# Patient Record
Sex: MALE | Race: WHITE | NOT HISPANIC OR LATINO | Employment: FULL TIME | URBAN - METROPOLITAN AREA
[De-identification: names, ages, dates, MRNs, and addresses within clinical notes are randomized per-mention and may not be internally consistent; named-entity substitution may affect disease eponyms.]

---

## 2021-04-22 ENCOUNTER — APPOINTMENT (OUTPATIENT)
Dept: GENERAL RADIOLOGY | Facility: HOSPITAL | Age: 26
End: 2021-04-22

## 2021-04-22 ENCOUNTER — HOSPITAL ENCOUNTER (EMERGENCY)
Facility: HOSPITAL | Age: 26
Discharge: HOME | End: 2021-04-22
Attending: EMERGENCY MEDICINE | Admitting: EMERGENCY MEDICINE

## 2021-04-22 VITALS
HEART RATE: 112 BPM | OXYGEN SATURATION: 98 % | TEMPERATURE: 98.8 F | BODY MASS INDEX: 38.07 KG/M2 | DIASTOLIC BLOOD PRESSURE: 97 MMHG | HEIGHT: 64 IN | SYSTOLIC BLOOD PRESSURE: 165 MMHG | WEIGHT: 223 LBS | RESPIRATION RATE: 18 BRPM

## 2021-04-22 DIAGNOSIS — S63.502A SPRAIN OF LEFT WRIST, INITIAL ENCOUNTER: Primary | ICD-10-CM

## 2021-04-22 PROCEDURE — 73110 X-RAY EXAM OF WRIST: CPT

## 2021-04-22 PROCEDURE — 99282 EMERGENCY DEPT VISIT SF MDM: CPT

## 2021-04-22 PROCEDURE — 99282 EMERGENCY DEPT VISIT SF MDM: CPT | Performed by: EMERGENCY MEDICINE

## 2021-04-22 RX ORDER — NAPROXEN 500 MG/1
500 TABLET ORAL 2 TIMES DAILY PRN
Qty: 30 TABLET | Refills: 0 | Status: SHIPPED | OUTPATIENT
Start: 2021-04-22 | End: 2021-10-26

## 2021-04-22 NOTE — DISCHARGE INSTRUCTIONS
Apply ice to the left wrist for 20 minutes every 2 hours while you are awake for 2 to 3 days and then apply moist heat.  Take the naproxen as needed as directed for pain.  Follow-up with Dr. Jaimes within 1 week.  Return to the emergency department if there is increased pain, numbness, swelling, fever, chills, weakness, worse in any way at all.

## 2021-04-22 NOTE — ED PROVIDER NOTES
"Subjective   History of Present Illness  History of Present Illness    Chief complaint: Wrist pain    Location: Left wrist    Quality/Severity: Moderate, sharp    Timing/Onset/Duration: Over the last couple of days, gradual onset    Modifying Factors: It hurts to dorsiflex the wrist    Associated Symptoms: No fever or chills.  No numbness, tingling, or weakness.  No change in color or temperature.  There is swelling.    Narrative: This 25-year-old white male presents with left wrist pain.  He works at [employer] and uses his hands a lot.  He also states that he bears weight on that left wrist during intercourse.  The patient denies any direct trauma.    PCP: No known provider      Review of Systems   Constitutional: Negative for chills and fever.   Musculoskeletal:        Left wrist pain   Neurological: Negative for weakness and numbness.        Medication List      You have not been prescribed any medications.         History reviewed. No pertinent past medical history.    No Known Allergies    Past Surgical History:   Procedure Laterality Date   • APPENDECTOMY         History reviewed. No pertinent family history.    Social History     Socioeconomic History   • Marital status: Single     Spouse name: Not on file   • Number of children: Not on file   • Years of education: Not on file   • Highest education level: Not on file   Tobacco Use   • Smoking status: Never Smoker   • Smokeless tobacco: Never Used   Substance and Sexual Activity   • Alcohol use: Yes     Comment: \"socially\"    • Drug use: Never           Objective   Physical Exam  Vitals and nursing note reviewed.   Constitutional:       Appearance: Normal appearance.   Musculoskeletal:      Comments: There is tenderness upon palpation of the dorsal surface of the left wrist.  The pain is increased on dorsiflexion of the wrist.  There is swelling.  The capillary refill is less than 2 seconds.  The sensation is intact.  There is a normal range of motion noted.  " There is no joint laxity noted.  There is a 2+ radial and ulnar pulse.  The left upper extremity is otherwise neurovascularly intact.   Skin:     General: Skin is warm and dry.      Capillary Refill: Capillary refill takes less than 2 seconds.      Findings: No rash.   Neurological:      General: No focal deficit present.      Mental Status: He is alert and oriented to person, place, and time.         Procedures           ED Course      18:02 EDT, 04/22/21:  A Velcro wrist splint was applied to the left wrist by the technician.  Application of the splint it was assessed by me and noted be in good position with the left upper extremity being neurovascularly intact.    18:03 EDT, 04/22/21:  The patient's diagnosis of left wrist sprain was discussed with him.  He should ice the left wrist for 20 minutes every 2 hours while he is awake for 2 to 3 days.  He should then apply moist heat.  He should wear the Velcro splint as needed for comfort.  He should follow-up with Dr. Jaimes within 1 week.  He should return to the emergency department if there is increased pain, swelling, numbness, tingling, weakness, change in color or temperature, fever, chills, worse in any way at all.  All of the patient's questions were answered he will be discharged in good condition.  The patient will be placed on naproxen as needed as directed for pain.                                     MDM    Final diagnoses:   Sprain of left wrist, initial encounter       ED Disposition  ED Disposition     None          No follow-up provider specified.       Medication List      No changes were made to your prescriptions during this visit.          Aryan Shah MD  04/22/21 9583

## 2022-04-20 ENCOUNTER — HOSPITAL ENCOUNTER (OUTPATIENT)
Dept: GENERAL RADIOLOGY | Facility: HOSPITAL | Age: 27
Discharge: HOME | End: 2022-04-20

## 2022-04-20 DIAGNOSIS — R05.9 COUGH: ICD-10-CM

## 2022-04-20 DIAGNOSIS — R07.81 RIB PAIN ON RIGHT SIDE: ICD-10-CM

## 2022-04-20 PROCEDURE — 71101 X-RAY EXAM UNILAT RIBS/CHEST: CPT

## 2022-06-16 ENCOUNTER — HOSPITAL ENCOUNTER (EMERGENCY)
Facility: HOSPITAL | Age: 27
Discharge: HOME | End: 2022-06-16
Attending: EMERGENCY MEDICINE | Admitting: EMERGENCY MEDICINE

## 2022-06-16 ENCOUNTER — APPOINTMENT (OUTPATIENT)
Dept: GENERAL RADIOLOGY | Facility: HOSPITAL | Age: 27
End: 2022-06-16

## 2022-06-16 VITALS
DIASTOLIC BLOOD PRESSURE: 103 MMHG | WEIGHT: 200 LBS | RESPIRATION RATE: 16 BRPM | SYSTOLIC BLOOD PRESSURE: 169 MMHG | OXYGEN SATURATION: 96 % | BODY MASS INDEX: 34.15 KG/M2 | HEART RATE: 90 BPM | TEMPERATURE: 98.2 F | HEIGHT: 64 IN

## 2022-06-16 DIAGNOSIS — R06.00 DYSPNEA, UNSPECIFIED TYPE: Primary | ICD-10-CM

## 2022-06-16 LAB
ALBUMIN SERPL-MCNC: 5.1 G/DL (ref 3.5–5.2)
ALBUMIN/GLOB SERPL: 1.9 G/DL
ALP SERPL-CCNC: 62 U/L (ref 39–117)
ALT SERPL W P-5'-P-CCNC: 62 U/L (ref 1–41)
ANION GAP SERPL CALCULATED.3IONS-SCNC: 12.5 MMOL/L (ref 5–15)
AST SERPL-CCNC: 29 U/L (ref 1–40)
BASOPHILS # BLD AUTO: 0.03 10*3/MM3 (ref 0–0.2)
BASOPHILS NFR BLD AUTO: 0.5 % (ref 0–1.5)
BILIRUB SERPL-MCNC: 0.3 MG/DL (ref 0–1.2)
BUN SERPL-MCNC: 10 MG/DL (ref 6–20)
BUN/CREAT SERPL: 8.6 (ref 7–25)
CALCIUM SPEC-SCNC: 9.9 MG/DL (ref 8.6–10.5)
CHLORIDE SERPL-SCNC: 100 MMOL/L (ref 98–107)
CO2 SERPL-SCNC: 24.5 MMOL/L (ref 22–29)
CREAT SERPL-MCNC: 1.16 MG/DL (ref 0.76–1.27)
D DIMER PPP FEU-MCNC: <0.27 MCGFEU/ML (ref 0–0.46)
DEPRECATED RDW RBC AUTO: 39.5 FL (ref 37–54)
EGFRCR SERPLBLD CKD-EPI 2021: 90.2 ML/MIN/1.73
EOSINOPHIL # BLD AUTO: 0 10*3/MM3 (ref 0–0.4)
EOSINOPHIL NFR BLD AUTO: 0 % (ref 0.3–6.2)
ERYTHROCYTE [DISTWIDTH] IN BLOOD BY AUTOMATED COUNT: 13.3 % (ref 12.3–15.4)
GLOBULIN UR ELPH-MCNC: 2.7 GM/DL
GLUCOSE SERPL-MCNC: 98 MG/DL (ref 65–99)
HCT VFR BLD AUTO: 46.2 % (ref 37.5–51)
HGB BLD-MCNC: 15.6 G/DL (ref 13–17.7)
IMM GRANULOCYTES # BLD AUTO: 0.02 10*3/MM3 (ref 0–0.05)
IMM GRANULOCYTES NFR BLD AUTO: 0.3 % (ref 0–0.5)
LYMPHOCYTES # BLD AUTO: 2.36 10*3/MM3 (ref 0.7–3.1)
LYMPHOCYTES NFR BLD AUTO: 36 % (ref 19.6–45.3)
MCH RBC QN AUTO: 27.9 PG (ref 26.6–33)
MCHC RBC AUTO-ENTMCNC: 33.8 G/DL (ref 31.5–35.7)
MCV RBC AUTO: 82.5 FL (ref 79–97)
MONOCYTES # BLD AUTO: 0.48 10*3/MM3 (ref 0.1–0.9)
MONOCYTES NFR BLD AUTO: 7.3 % (ref 5–12)
NEUTROPHILS NFR BLD AUTO: 3.66 10*3/MM3 (ref 1.7–7)
NEUTROPHILS NFR BLD AUTO: 55.9 % (ref 42.7–76)
NRBC BLD AUTO-RTO: 0 /100 WBC (ref 0–0.2)
PLATELET # BLD AUTO: 249 10*3/MM3 (ref 140–450)
PMV BLD AUTO: 10.4 FL (ref 6–12)
POTASSIUM SERPL-SCNC: 4 MMOL/L (ref 3.5–5.2)
PROT SERPL-MCNC: 7.8 G/DL (ref 6–8.5)
RBC # BLD AUTO: 5.6 10*6/MM3 (ref 4.14–5.8)
SODIUM SERPL-SCNC: 137 MMOL/L (ref 136–145)
WBC NRBC COR # BLD: 6.55 10*3/MM3 (ref 3.4–10.8)

## 2022-06-16 PROCEDURE — 80053 COMPREHEN METABOLIC PANEL: CPT | Performed by: EMERGENCY MEDICINE

## 2022-06-16 PROCEDURE — 85379 FIBRIN DEGRADATION QUANT: CPT | Performed by: EMERGENCY MEDICINE

## 2022-06-16 PROCEDURE — 71046 X-RAY EXAM CHEST 2 VIEWS: CPT

## 2022-06-16 PROCEDURE — 93010 ELECTROCARDIOGRAM REPORT: CPT | Performed by: INTERNAL MEDICINE

## 2022-06-16 PROCEDURE — 93005 ELECTROCARDIOGRAM TRACING: CPT | Performed by: EMERGENCY MEDICINE

## 2022-06-16 PROCEDURE — 99283 EMERGENCY DEPT VISIT LOW MDM: CPT

## 2022-06-16 PROCEDURE — 99282 EMERGENCY DEPT VISIT SF MDM: CPT | Performed by: EMERGENCY MEDICINE

## 2022-06-16 PROCEDURE — 85025 COMPLETE CBC W/AUTO DIFF WBC: CPT | Performed by: EMERGENCY MEDICINE

## 2022-06-16 RX ORDER — SODIUM CHLORIDE 0.9 % (FLUSH) 0.9 %
10 SYRINGE (ML) INJECTION AS NEEDED
Status: DISCONTINUED | OUTPATIENT
Start: 2022-06-16 | End: 2022-06-17 | Stop reason: HOSPADM

## 2022-06-16 RX ADMIN — SODIUM CHLORIDE 500 ML: 9 INJECTION, SOLUTION INTRAVENOUS at 20:16

## 2022-06-17 LAB — QT INTERVAL: 325 MS

## 2022-06-17 NOTE — DISCHARGE INSTRUCTIONS
Please call the number listed to establish primary care physician.  Please return to the emergency room if you develop chest pain, worsening symptoms or for any other concerns.

## 2022-06-17 NOTE — ED PROVIDER NOTES
"Subjective   History of Present Illness  26-year-old male presents to the emergency room complaining of having some shortness of breath earlier tonight.  He said he had sudden shortness of breath that lasted for little while and got concerns or come to the ER.  No chest pain no nausea no vomiting no other complaints  Review of Systems   All other systems reviewed and are negative.      History reviewed. No pertinent past medical history.    No Known Allergies    Past Surgical History:   Procedure Laterality Date   • APPENDECTOMY     • HERNIA REPAIR         History reviewed. No pertinent family history.    Social History     Socioeconomic History   • Marital status: Single   Tobacco Use   • Smoking status: Current Some Day Smoker   • Smokeless tobacco: Never Used   Vaping Use   • Vaping Use: Never used   Substance and Sexual Activity   • Alcohol use: Yes     Comment: \"socially\"    • Drug use: Yes     Comment: \"socially\"   • Sexual activity: Defer           Objective    ED Triage Vitals [06/16/22 1955]   Temp Heart Rate Resp BP SpO2   98.2 °F (36.8 °C) 101 18 (!) 169/103 98 %      Temp src Heart Rate Source Patient Position BP Location FiO2 (%)   -- -- -- -- --       Physical Exam  INITIAL VITAL SIGNS: Reviewed by me.  Pulse ox normal  GENERAL: Alert and interactive. No acute distress.  HEAD: Head is normocephalic.  EYES: EOMI. PERRL. No scleral icterus. No conjunctival injection.  ENT: Moist mucous membranes.   NECK: Supple. Full range of motion.  RESPIRATORY: No tachypnea. Clear breath sounds bilaterally. No wheezing. No rales. No rhonchi.  CV: Regular rate and rhythm. No murmurs. No rubs or gallops.  ABDOMEN: Soft, nondistended, nontender. No guarding. No rebound. No masses.   BACK:  No obvious deformity.  EXTREMITIES: No deformity. No clubbing or cyanosis. No edema.   SKIN: Warm and dry. No diaphoresis. No obvious rashes.   NEUROLOGIC: Alert and oriented. Face is symmetric. Speech is normal. Moves all extremities " equally. Motor and sensory distally intact.     Procedures           ED Course  ED Course as of 06/16/22 2234   Thu Jun 16, 2022 2028 EKG shows a sinus rhythm with rate of 90, normal IN interval, normal QRS duration with normal axis, no ST elevations or depressions [RO]      ED Course User Index  [RO] Danilo Mejia MD                                                 [facility]  Patient with some shortness of breath, was initially tachycardic work-up to rule out PE, has a negative dimer clear chest x-ray normal labs, no concern for pneumonia no concern for cardiac.  Discharging home.  Final diagnoses:   Dyspnea, unspecified type       ED Disposition  ED Disposition     ED Disposition   Discharge    Condition   Good    Comment   --             Provider, No Known  [place] [id number] [phone]    Call   To establish primary care physician.         Medication List      No changes were made to your prescriptions during this visit.          Danilo Mejia MD  06/16/22 2234

## 2022-11-14 ENCOUNTER — APPOINTMENT (OUTPATIENT)
Dept: GENERAL RADIOLOGY | Facility: HOSPITAL | Age: 27
End: 2022-11-14

## 2022-11-14 ENCOUNTER — HOSPITAL ENCOUNTER (EMERGENCY)
Facility: HOSPITAL | Age: 27
Discharge: HOME | End: 2022-11-14
Attending: EMERGENCY MEDICINE | Admitting: EMERGENCY MEDICINE

## 2022-11-14 VITALS
HEART RATE: 104 BPM | RESPIRATION RATE: 16 BRPM | DIASTOLIC BLOOD PRESSURE: 97 MMHG | BODY MASS INDEX: 34.15 KG/M2 | HEIGHT: 64 IN | OXYGEN SATURATION: 99 % | WEIGHT: 200 LBS | SYSTOLIC BLOOD PRESSURE: 158 MMHG | TEMPERATURE: 98 F

## 2022-11-14 DIAGNOSIS — M25.531 RIGHT WRIST PAIN: Primary | ICD-10-CM

## 2022-11-14 PROCEDURE — 99282 EMERGENCY DEPT VISIT SF MDM: CPT

## 2022-11-14 PROCEDURE — 73110 X-RAY EXAM OF WRIST: CPT

## 2022-11-14 PROCEDURE — 73130 X-RAY EXAM OF HAND: CPT

## 2022-11-14 RX ORDER — NAPROXEN 500 MG/1
500 TABLET ORAL 2 TIMES DAILY PRN
Qty: 10 TABLET | Refills: 0 | Status: SHIPPED | OUTPATIENT
Start: 2022-11-14 | End: 2022-11-19
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
## 2024-05-13 ENCOUNTER — APPOINTMENT (OUTPATIENT)
Dept: GENERAL RADIOLOGY | Facility: HOSPITAL | Age: 29
End: 2024-05-13
Payer: COMMERCIAL

## 2024-05-13 ENCOUNTER — HOSPITAL ENCOUNTER (EMERGENCY)
Facility: HOSPITAL | Age: 29
Discharge: HOME | End: 2024-05-13
Attending: STUDENT IN AN ORGANIZED HEALTH CARE EDUCATION/TRAINING PROGRAM
Payer: COMMERCIAL

## 2024-05-13 VITALS
WEIGHT: 200 LBS | DIASTOLIC BLOOD PRESSURE: 81 MMHG | TEMPERATURE: 98 F | HEART RATE: 92 BPM | SYSTOLIC BLOOD PRESSURE: 148 MMHG | HEIGHT: 64 IN | BODY MASS INDEX: 34.15 KG/M2 | OXYGEN SATURATION: 97 % | RESPIRATION RATE: 18 BRPM

## 2024-05-13 DIAGNOSIS — S62.639A CLOSED FRACTURE OF TUFT OF DISTAL PHALANX OF FINGER: Primary | ICD-10-CM

## 2024-05-13 DIAGNOSIS — S60.10XA SUBUNGUAL HEMATOMA OF DIGIT OF HAND, INITIAL ENCOUNTER: ICD-10-CM

## 2024-05-13 PROCEDURE — 73140 X-RAY EXAM OF FINGER(S): CPT

## 2024-05-13 PROCEDURE — 99283 EMERGENCY DEPT VISIT LOW MDM: CPT

## 2024-05-13 NOTE — ED PROVIDER NOTES
"Subjective   History of Present Illness  28-year presents with pain to the left fifth digit after he accidentally slammed his finger into a car door.  The patient admits to some swelling with mild displacement of his nailbed.  He denies any numbness, tingling, loss of sensation or decrease in range of motion.  He does admit to some discoloration of his nail.      Review of Systems   Constitutional:  Negative for activity change, chills, diaphoresis and fever.   HENT: Negative.     Respiratory: Negative.     Cardiovascular: Negative.    Skin: Negative.    Neurological: Negative.    All other systems reviewed and are negative.      History reviewed. No pertinent past medical history.    No Known Allergies    Past Surgical History:   Procedure Laterality Date    APPENDECTOMY      HERNIA REPAIR         History reviewed. No pertinent family history.    Social History     Socioeconomic History    Marital status: Single   Tobacco Use    Smoking status: Former     Types: Cigarettes    Smokeless tobacco: Never   Vaping Use    Vaping status: Never Used   Substance and Sexual Activity    Alcohol use: Yes     Comment: \"socially\"     Drug use: Yes     Comment: \"socially\"    Sexual activity: Defer           Objective   Physical Exam  Vitals and nursing note reviewed.   Constitutional:       Appearance: Normal appearance. He is normal weight. He is not ill-appearing, toxic-appearing or diaphoretic.   HENT:      Head: Normocephalic and atraumatic.      Right Ear: External ear normal.      Left Ear: External ear normal.      Nose: Nose normal. No congestion or rhinorrhea.      Mouth/Throat:      Pharynx: No oropharyngeal exudate or posterior oropharyngeal erythema.   Eyes:      Extraocular Movements: Extraocular movements intact.      Conjunctiva/sclera: Conjunctivae normal.      Pupils: Pupils are equal, round, and reactive to light.   Cardiovascular:      Rate and Rhythm: Normal rate and regular rhythm.      Pulses: Normal pulses. "   Pulmonary:      Effort: Pulmonary effort is normal.      Breath sounds: Normal breath sounds. No stridor. No wheezing, rhonchi or rales.   Chest:      Chest wall: No tenderness.   Abdominal:      General: There is no distension.      Palpations: Abdomen is soft. There is no mass.   Musculoskeletal:         General: No swelling, tenderness or signs of injury. Normal range of motion.      Left hand: Swelling present.        Hands:       Cervical back: Normal range of motion. No rigidity or tenderness.   Skin:     General: Skin is warm.      Capillary Refill: Capillary refill takes less than 2 seconds.      Coloration: Skin is not jaundiced or pale.      Findings: No bruising.   Neurological:      General: No focal deficit present.      Mental Status: He is alert and oriented to person, place, and time. Mental status is at baseline.      Motor: No weakness.         Procedures           ED Course                                             Medical Decision Making  Splinting Procedure Note  Time:       Confirmed correct: Patient, procedure, side, site    Consent: Patient, verbal    Indication: Nondisplaced fracture    Location: left hand    Pre procedure exam: Circulation motor and sensory intact    Post procedure exam: Circulation motor and sensory intact    Immobilization: finger splint and abdoulaye tape    Post splint application distal neurovascular exam normal      Patient tolerated: Well      Performed by: Yang Mcclellan DO    Total time: 10 minutes      ========================  MDM:    Escalation of care including admission/observation considered    - Discussions of management with other providers:  None    - Discussed/reviewed with Radiology regarding test interpretation    - Independent interpretation: XR    - Additional patient history obtained from: None    - Review of external non-ED record (if available):  Prior Inpt record, Office record, Outpt record, Prior Outpt labs, PCP record, Outside ED record,  Other    - Chronic conditions affecting care: See HPI and medical Hx.    - Social Determinants of health significantly affecting care:  None        Medical Decision Making Discussion:    This is a well-appearing 28-year-old presents the emergency department complaints of left finger pain over the fifth digit after he accidentally slammed in a car door.  The nailbed does have a moderate subungual hematoma, trephination performed here in the ED without significant abnormality or patient complaint.  The patient's wound was cleaned, we will use the nailbed as a biological bandage however, likely the nail will fall off.  Will continue to monitor this.  No antibiotics given at this time.  X-rays performed.  There is a tuft fracture of the distal aspect of the finger on the left of the fifth digit.  Patient given splint, wound is cleaned and he is otherwise stable for discharge.    The patient's wound is placed in a finger splint.  He is given abdoulaye taping and Ace wrap.  Patient tolerated well, otherwise well-appearing and stable for discharge.      The patient has been given very strict return precautions to return to the emergency department should there be any acute change or worsening of their condition.  I have explained my findings and the patient has expressed understanding to me.  I explained that the work-up performed in the ED has been based on the specific complaint and concern, as the nature of emergency medicine is complaint driven and they understand that new symptoms may arise.  I have told them that, should there be any new symptoms, worsening or changing symptoms, a new work-up may be indicated that they are encouraged to return to the emergency department or promptly contact their primary care physician. We have employed a shared decision-making process as the discussion of their disposition.  The patient has been educated as to the nature of the visit, the tests and work-up performed and the findings  from today's visit. At this time, there does not appear to be any acute emergent process that necessitates admission to the hospital, however, the patient understands that this can change unexpectedly. At this time, the patient is stable for discharge home and agrees to follow-up with her primary care physician in the next 24 to 48 hours or earlier should they be able to obtain an appointment.    The patient was counseled regarding diagnostic results and treatment plan and patient has indicated understanding of these instructions.      Amount and/or Complexity of Data Reviewed  Radiology: ordered.        Final diagnoses:   Closed fracture of tuft of distal phalanx of finger   Subungual hematoma of digit of hand, initial encounter       ED Disposition  ED Disposition       ED Disposition   Discharge    Condition   Good    Comment   --               No follow-up provider specified.       Medication List      No changes were made to your prescriptions during this visit.            Yang Mcclellan DO  05/13/24 1953

## 2024-05-13 NOTE — Clinical Note
EARLENE AVILA  [facility] EMERGENCY DEPARTMENT  [address] [place] [address]  Phone: [phone]    Dagoberto Diana was seen and treated in our emergency department on 5/13/2024.  He may return to work on 05/15/2024.         Thank you for choosing [facility].    Yang Mcclellan, DO

## 2024-05-13 NOTE — Clinical Note
LA GRANKRZYSZTOF  [facility] EMERGENCY DEPARTMENT  [address] [place] [address]  Phone: [phone]    Dagoberto Diana was seen and treated in our emergency department on 5/13/2024.  He may return to work on 05/15/2024.  Do not lift heavy objects for two days. (5/15/2024)  Do not submerge finger in any liquid for 1 week/ until 5/20/2024.        Thank you for choosing [facility].    Yang Mcclellan, DO

## 2024-05-13 NOTE — DISCHARGE INSTRUCTIONS

## 2024-12-30 ENCOUNTER — OFFICE VISIT (OUTPATIENT)
Dept: GASTROENTEROLOGY | Facility: CLINIC | Age: 29
End: 2024-12-30
Payer: COMMERCIAL

## 2024-12-30 ENCOUNTER — PREP FOR SURGERY (OUTPATIENT)
Dept: SURGERY | Facility: SURGERY CENTER | Age: 29
End: 2024-12-30
Payer: COMMERCIAL

## 2024-12-30 VITALS
WEIGHT: 221.4 LBS | SYSTOLIC BLOOD PRESSURE: 130 MMHG | BODY MASS INDEX: 37.8 KG/M2 | DIASTOLIC BLOOD PRESSURE: 90 MMHG | HEIGHT: 64 IN

## 2024-12-30 DIAGNOSIS — R12 HEARTBURN: Primary | ICD-10-CM

## 2024-12-30 PROCEDURE — 99214 OFFICE O/P EST MOD 30 MIN: CPT

## 2024-12-30 RX ORDER — SODIUM CHLORIDE, SODIUM LACTATE, POTASSIUM CHLORIDE, CALCIUM CHLORIDE 600; 310; 30; 20 MG/100ML; MG/100ML; MG/100ML; MG/100ML
30 INJECTION, SOLUTION INTRAVENOUS CONTINUOUS PRN
OUTPATIENT
Start: 2024-12-30 | End: 2024-12-31

## 2024-12-30 RX ORDER — SODIUM CHLORIDE 0.9 % (FLUSH) 0.9 %
10 SYRINGE (ML) INJECTION AS NEEDED
OUTPATIENT
Start: 2024-12-30

## 2024-12-30 RX ORDER — SODIUM CHLORIDE 0.9 % (FLUSH) 0.9 %
3 SYRINGE (ML) INJECTION EVERY 12 HOURS SCHEDULED
OUTPATIENT
Start: 2024-12-30

## 2024-12-30 NOTE — PATIENT INSTRUCTIONS
Schedule EGD for further evaluation, orders placed.    Additional recommendations will be made based on EGD findings.     Ways to help reduce heartburn symptoms:    ContinuePrilosec/Omeprazole before breakfast  Avoid eating late night snacks within 3 hours of going to bed  If you have increased abdominal body weight, lifestyle changes to improve weight can help with heartburn symtoms  If you use tobacco products, we recommend that you stop smoking including e-cigarettes  Research has proven that people with heartburn who use tobacco can reduce heartburn symptoms by 44% after they stop smoking.   Sleep on your left side  Sleeping with your head/upper body elevated with a wedge pillow or with the head of the bed inclined   Avoid foods that can trigger symptoms which may include:  citrus fruits  spicy foods,  Tomatoes and Red sauces   Chocolate  coffee/tea  caffeinated or carbonated beverages  Alcohol  High fat foods  peppermint

## 2024-12-30 NOTE — PROGRESS NOTES
Chief Complaint   Patient presents with    Heartburn    Nausea    Vomiting         Patient is a 29 y.o. who presents to the office as a new patient for further evaluation of GERD after referral received from primary care provider Dr. Yap.   Patient has a significant past medical history of asthma, heartburn, arthralgias.    Past Surgical History:  Appendectomy    Social History:  Former tobacco user  Reports social alcohol consumption    Family history:  Crohn's disease in mother    History of Present Illness  The patient is a 29-year-old male who presents for evaluation of pyrosis.    He has been experiencing pyrosis for approximately 3 years, which he attributes to his nocturnal eating habits associated with his employment at [employer]. He denies dysphagia. However, he occasionally experiences nausea and emesis secondary to severe gastroesophageal reflux. The symptoms are severe enough to cause nocturnal awakenings. He denies any unintentional weight loss.     He occasionally uses nonsteroidal anti-inflammatory drugs (NSAIDs) such as ibuprofen or naproxen, but not on a daily basis. His alcohol consumption is minimal, limited to approximately 2 shots per week. He reports infrequent use of tobacco or marijuana, typically reserved for social occasions. His bowel movements are regular, and he reports no abdominal pain unless he ingests food that disagrees with him or contracts a gastrointestinal infection.     He has been managing these symptoms with omeprazole for the past 2 years, which he finds effective. He administers the omeprazole around 8:00 or 9:00 in the morning. However, if he forgets to take the medication for a few days, the symptoms recur.    SOCIAL HISTORY  - Works at [employer]  - Consumes alcohol sparingly, approximately 2 shots per week  - Uses tobacco and marijuana very rarely, only in social settings    MEDICATIONS  - Omeprazole      Result Review :        reviewed and updated with  "patient  Outpatient Medications Marked as Taking for the 12/30/24 encounter (Office Visit) with Aimee Alcazar APRN   Medication Sig Dispense Refill    omeprazole (priLOSEC) 20 MG capsule        Vital Signs:   /90 (BP Location: Right arm, Patient Position: Sitting, Cuff Size: Large Adult)   Ht 162.6 cm (64.02\")   Wt 100 kg (221 lb 6.4 oz)   BMI 37.98 kg/m²     Body mass index is 37.98 kg/m².  Physical Exam  Assessment and Plan    Diagnoses and all orders for this visit:    1. Heartburn (Primary)       Assessment & Plan  Heartburn  - Experiencing heartburn for a couple of years, managed with omeprazole  - Symptoms return if doses are missed  - On omeprazole for 2 years  - EGD recommended to evaluate esophagus, stomach, and small bowel for inflammation or other issues  - Esophagram discussed as a less invasive option, but patient prefers EGD  - Procedure will include screening for H. pylori infection  - Advised to continue taking omeprazole at least 30 minutes prior to the first meal of the day  - Will meet with scheduling staff to arrange EGD    Follow-up  - Patient to follow up in 4 to 6 weeks after the EGD  Patient is agreeable to the outlined above treatment plan.  Verbalizes understanding and will contact office for any new or worsening concerns.  All questions answered and support provided.  Patient Instructions   Schedule EGD for further evaluation, orders placed.    Additional recommendations will be made based on EGD findings.     Ways to help reduce heartburn symptoms:    ContinuePrilosec/Omeprazole before breakfast  Avoid eating late night snacks within 3 hours of going to bed  If you have increased abdominal body weight, lifestyle changes to improve weight can help with heartburn symtoms  If you use tobacco products, we recommend that you stop smoking including e-cigarettes  Research has proven that people with heartburn who use tobacco can reduce heartburn symptoms by 44% after they stop " smoking.   Sleep on your left side  Sleeping with your head/upper body elevated with a wedge pillow or with the head of the bed inclined   Avoid foods that can trigger symptoms which may include:  citrus fruits  spicy foods,  Tomatoes and Red sauces   Chocolate  coffee/tea  caffeinated or carbonated beverages  Alcohol  High fat foods  peppermint      EMR Dragon/Transcription Disclaimer:  This document has been Dictated utilizing Dragon dictation.   Patient or patient representative verbalized consent for the use of Ambient Listening during the visit with  CODEY Tamayo for chart documentation. 12/30/2024  15:33 EST

## 2025-01-07 ENCOUNTER — TELEPHONE (OUTPATIENT)
Dept: GASTROENTEROLOGY | Facility: CLINIC | Age: 30
End: 2025-01-07
Payer: COMMERCIAL

## 2025-01-16 ENCOUNTER — OFFICE VISIT (OUTPATIENT)
Dept: ORTHOPEDIC SURGERY | Facility: CLINIC | Age: 30
End: 2025-01-16
Payer: COMMERCIAL

## 2025-01-16 VITALS
HEART RATE: 85 BPM | BODY MASS INDEX: 37.73 KG/M2 | HEIGHT: 64 IN | DIASTOLIC BLOOD PRESSURE: 87 MMHG | SYSTOLIC BLOOD PRESSURE: 157 MMHG | WEIGHT: 221 LBS

## 2025-01-16 DIAGNOSIS — M25.561 CHRONIC PAIN OF RIGHT KNEE: ICD-10-CM

## 2025-01-16 DIAGNOSIS — M25.561 PAIN IN BOTH KNEES, UNSPECIFIED CHRONICITY: Primary | ICD-10-CM

## 2025-01-16 DIAGNOSIS — M25.562 PAIN IN BOTH KNEES, UNSPECIFIED CHRONICITY: Primary | ICD-10-CM

## 2025-01-16 DIAGNOSIS — G89.29 CHRONIC PAIN OF RIGHT KNEE: ICD-10-CM

## 2025-01-16 DIAGNOSIS — Z87.828 HISTORY OF DISLOCATION OF KNEE: ICD-10-CM

## 2025-01-16 PROCEDURE — 99203 OFFICE O/P NEW LOW 30 MIN: CPT | Performed by: ORTHOPAEDIC SURGERY

## 2025-01-16 PROCEDURE — 1160F RVW MEDS BY RX/DR IN RCRD: CPT | Performed by: ORTHOPAEDIC SURGERY

## 2025-01-16 PROCEDURE — 1159F MED LIST DOCD IN RCRD: CPT | Performed by: ORTHOPAEDIC SURGERY

## 2025-01-16 RX ORDER — MELOXICAM 15 MG/1
15 TABLET ORAL DAILY
Qty: 30 TABLET | Refills: 5 | Status: SHIPPED | OUTPATIENT
Start: 2025-01-16

## 2025-01-16 NOTE — PROGRESS NOTES
"Subjective: Chronic knee pain right more than left     Patient ID: Dagoberto Diana is a 29 y.o. male.    Chief Complaint:    History of Present Illness 29-year-old male is seen basically for chronic knee pain that he has had since he was a teenager states he dislocated that knee when he is playing high school football.  Did not have to go to the emergency room it slipped back in the place on his own but since that time he has had probably 5 or 6 dislocations of that knee.  The last being a couple of years ago but he is having chronic pain and discomfort in that right knee.  Some soreness in the left but the right is most problematic.  When the initial injury occurred he saw a doctor in [place] who recommended physical therapy.  He is taken occasional anti-inflammatories but nothing on a regular basis.  He describes the pain as mild 4 out of 10 has a dull achy discomfort in the knee occasional stiffness in the knee he has trouble climbing stairs getting in and out of a chair.  Most of the pain is with activity minimal pain at rest.       Social History     Occupational History    Not on file   Tobacco Use    Smoking status: Passive Smoke Exposure - Never Smoker    Smokeless tobacco: Never    Tobacco comments:     Very rarely smoke a single cigarette. Social/Passive use only   Vaping Use    Vaping status: Never Used   Substance and Sexual Activity    Alcohol use: Yes     Alcohol/week: 2.0 standard drinks of alcohol     Types: 2 Shots of liquor per week     Comment: \"socially\"     Drug use: Yes     Comment: \"socially\"    Sexual activity: Yes     Partners: Female     Birth control/protection: Depo-provera      Review of Systems      History reviewed. No pertinent past medical history.  Past Surgical History:   Procedure Laterality Date    APPENDECTOMY      HERNIA REPAIR       Family History   Problem Relation Age of Onset    Crohn's disease Mother     Crohn's disease Maternal Grandmother          Objective:  Vitals: "    01/16/25 1318   BP: 157/87   Pulse: 85         01/16/25  1318   Weight: 100 kg (221 lb)     Body mass index is 37.93 kg/m².  Class 2 Severe Obesity (BMI >=35 and <=39.9). Obesity-related health conditions include the following:    . Obesity is    . BMI is    . We discussed portion control, increasing exercise, and   .        Ortho Exam   the lateral sunrise view of both knees were completed.  On the right knee he has a laterally subluxed patella with osteophytes in the patellofemoral joint.  On the AP the medial condyle appears slightly flattened but he does have a joint space.  The left knee shows slightly subluxed patella but is well located within the trochlea.  Also on the right knee there is a calcification lateral to the patella noted.  He is alert and orient x 3.  Head is normocephalic and sclerae clear.  The right knee shows no swelling effusion erythema and is 0 to 130 degrees of motion mild patellofemoral crepitus with popping noted in the knee with extension slightly lateral riding patella in full extension.  Quad hamstring function 5/5.  No instability in flexion extension  Varus valgus instability 10 to 30 degrees.  There is no medial parapatellar pain.  Mild medial joint line tenderness but negative Guardado's and the calf is nontender.  Good distal pulses left knee shows no swelling effusion erythema is cool to touch.  Is 0 to 125 degree motion with minimal patellofemoral crepitance or popping.  Quad hamstring function 5/5.  No instability in flexion extension or any varus or valgus instability 10 to 30 degrees there is no joint line tenderness and his calf is nontender.  He has good distal pulses no motor or sensory deficit in either leg and he tolerates anti-inflammatories but not take anything a regular basis    Assessment:        1. Pain in both knees, unspecified chronicity    2. Chronic pain of right knee    3. History of dislocation of knee           Plan: Reviewed with the patient his history  x-ray and physical exam.  On the right knee is a chronically dislocating patella has lateral riding patella on x-ray with osteophytes.  Will start meloxicam 15 mg a day but also get a get an MRI of that right knee to evaluate the the knee for intra-articular pathology.  Return after the MRIs been completed.  Patient was in agreement.  Answered all questions            Work Status:    LYDIA query complete.    Orders:  Orders Placed This Encounter   Procedures    XR Knee 3 View Bilateral    MRI Knee Right Without Contrast       Medications:  New Medications Ordered This Visit   Medications    meloxicam (MOBIC) 15 MG tablet     Sig: Take 1 tablet by mouth Daily.     Dispense:  30 tablet     Refill:  5       Followup:  Return in about 4 weeks (around 2/13/2025).          Dictated utilizing Dragon dictation

## 2025-01-17 ENCOUNTER — PATIENT ROUNDING (BHMG ONLY) (OUTPATIENT)
Dept: ORTHOPEDIC SURGERY | Facility: CLINIC | Age: 30
End: 2025-01-17
Payer: COMMERCIAL

## 2025-01-17 NOTE — PROGRESS NOTES
A My-Chart message has been sent to the patient for PATIENT ROUNDING with [facility].

## 2025-02-04 ENCOUNTER — ANESTHESIA EVENT (OUTPATIENT)
Dept: PERIOP | Facility: HOSPITAL | Age: 30
End: 2025-02-04
Payer: COMMERCIAL

## 2025-02-04 NOTE — PAT
PT  states that he has not stopped his Mobic (meloxicam) to date   Messge left on Am Ryan's voice mail to inform Dr Breaux  and then contact pt

## 2025-02-05 ENCOUNTER — ANESTHESIA (OUTPATIENT)
Dept: PERIOP | Facility: HOSPITAL | Age: 30
End: 2025-02-05
Payer: COMMERCIAL

## 2025-02-05 ENCOUNTER — HOSPITAL ENCOUNTER (OUTPATIENT)
Facility: HOSPITAL | Age: 30
Setting detail: HOSPITAL OUTPATIENT SURGERY
Discharge: HOME | End: 2025-02-05
Attending: STUDENT IN AN ORGANIZED HEALTH CARE EDUCATION/TRAINING PROGRAM | Admitting: STUDENT IN AN ORGANIZED HEALTH CARE EDUCATION/TRAINING PROGRAM
Payer: COMMERCIAL

## 2025-02-05 ENCOUNTER — HOSPITAL ENCOUNTER (OUTPATIENT)
Dept: MRI IMAGING | Facility: HOSPITAL | Age: 30
Discharge: HOME | End: 2025-02-05
Payer: COMMERCIAL

## 2025-02-05 VITALS
BODY MASS INDEX: 37.76 KG/M2 | SYSTOLIC BLOOD PRESSURE: 136 MMHG | HEART RATE: 80 BPM | WEIGHT: 220 LBS | OXYGEN SATURATION: 97 % | RESPIRATION RATE: 16 BRPM | DIASTOLIC BLOOD PRESSURE: 81 MMHG | TEMPERATURE: 98 F

## 2025-02-05 DIAGNOSIS — R12 HEARTBURN: ICD-10-CM

## 2025-02-05 DIAGNOSIS — Z87.828 HISTORY OF DISLOCATION OF KNEE: ICD-10-CM

## 2025-02-05 DIAGNOSIS — G89.29 CHRONIC PAIN OF RIGHT KNEE: ICD-10-CM

## 2025-02-05 DIAGNOSIS — M25.561 CHRONIC PAIN OF RIGHT KNEE: ICD-10-CM

## 2025-02-05 PROCEDURE — 25010000002 PROPOFOL 200 MG/20ML EMULSION: Performed by: NURSE ANESTHETIST, CERTIFIED REGISTERED

## 2025-02-05 PROCEDURE — 88305 TISSUE EXAM BY PATHOLOGIST: CPT | Performed by: STUDENT IN AN ORGANIZED HEALTH CARE EDUCATION/TRAINING PROGRAM

## 2025-02-05 PROCEDURE — 43239 EGD BIOPSY SINGLE/MULTIPLE: CPT | Performed by: STUDENT IN AN ORGANIZED HEALTH CARE EDUCATION/TRAINING PROGRAM

## 2025-02-05 PROCEDURE — 73721 MRI JNT OF LWR EXTRE W/O DYE: CPT

## 2025-02-05 RX ORDER — SODIUM CHLORIDE 0.9 % (FLUSH) 0.9 %
3 SYRINGE (ML) INJECTION EVERY 12 HOURS SCHEDULED
Status: DISCONTINUED | OUTPATIENT
Start: 2025-02-05 | End: 2025-02-05 | Stop reason: HOSPADM

## 2025-02-05 RX ORDER — ONDANSETRON 2 MG/ML
4 INJECTION INTRAMUSCULAR; INTRAVENOUS ONCE AS NEEDED
Status: DISCONTINUED | OUTPATIENT
Start: 2025-02-05 | End: 2025-02-05 | Stop reason: HOSPADM

## 2025-02-05 RX ORDER — LIDOCAINE HYDROCHLORIDE 10 MG/ML
0.5 INJECTION, SOLUTION EPIDURAL; INFILTRATION; INTRACAUDAL; PERINEURAL ONCE AS NEEDED
Status: DISCONTINUED | OUTPATIENT
Start: 2025-02-05 | End: 2025-02-05 | Stop reason: HOSPADM

## 2025-02-05 RX ORDER — SODIUM CHLORIDE, SODIUM LACTATE, POTASSIUM CHLORIDE, CALCIUM CHLORIDE 600; 310; 30; 20 MG/100ML; MG/100ML; MG/100ML; MG/100ML
30 INJECTION, SOLUTION INTRAVENOUS CONTINUOUS PRN
Status: DISCONTINUED | OUTPATIENT
Start: 2025-02-05 | End: 2025-02-05 | Stop reason: HOSPADM

## 2025-02-05 RX ORDER — PROPOFOL 10 MG/ML
INJECTION, EMULSION INTRAVENOUS AS NEEDED
Status: DISCONTINUED | OUTPATIENT
Start: 2025-02-05 | End: 2025-02-05 | Stop reason: SURG

## 2025-02-05 RX ORDER — SODIUM CHLORIDE 0.9 % (FLUSH) 0.9 %
10 SYRINGE (ML) INJECTION AS NEEDED
Status: DISCONTINUED | OUTPATIENT
Start: 2025-02-05 | End: 2025-02-05 | Stop reason: HOSPADM

## 2025-02-05 RX ADMIN — PROPOFOL 20 MG: 10 INJECTION, EMULSION INTRAVENOUS at 10:06

## 2025-02-05 RX ADMIN — PROPOFOL 40 MG: 10 INJECTION, EMULSION INTRAVENOUS at 10:05

## 2025-02-05 RX ADMIN — PROPOFOL 50 MG: 10 INJECTION, EMULSION INTRAVENOUS at 10:04

## 2025-02-05 RX ADMIN — PROPOFOL 40 MG: 10 INJECTION, EMULSION INTRAVENOUS at 10:03

## 2025-02-05 RX ADMIN — PROPOFOL 80 MG: 10 INJECTION, EMULSION INTRAVENOUS at 10:02

## 2025-02-05 RX ADMIN — PROPOFOL 20 MG: 10 INJECTION, EMULSION INTRAVENOUS at 10:07

## 2025-02-05 NOTE — DISCHARGE INSTRUCTIONS
Upper Endoscopy, Adult, Care After  This sheet gives you information about how to care for yourself after your procedure. Your health care provider may also give you more specific instructions. If you have problems or questions, contact your health care provider.  What can I expect after the procedure?  After the procedure, it is common to have:  A sore throat.  Mild stomach pain or discomfort.  Bloating.  Nausea.  Follow these instructions at home:       Follow instructions from your health care provider about what to eat or drink after your procedure.  Return to your normal activities as told by your health care provider. Ask your health care provider what activities are safe for you.  Take over-the-counter and prescription medicines only as told by your health care provider.  DO NOT DRIVE FOR THE REST OF TODAY if you were given a sedative during your procedure.  Keep all follow-up visits as told by your health care provider. This is important.  Contact a health care provider if you have:  A sore throat that lasts longer than one day.  Trouble swallowing.  Get help right away if:  You vomit blood or your vomit looks like coffee grounds.  You have:  A fever.  Bloody, black, or tarry stools.  A severe sore throat or you cannot swallow.  Difficulty breathing.  Severe pain in your chest or abdomen.  Summary  After the procedure, it is common to have a sore throat, mild stomach discomfort, bloating, and nausea.  Do not drive TODAY if you were given a sedative during the procedure.  Follow instructions from your health care provider about what to eat or drink after your procedure.  Return to your normal activities as told by your health care provider.  This information is not intended to replace advice given to you by your health care provider. Make sure you discuss any questions you have with your health care provider.  Document Released: 06/18/2013 Document Revised: 05/20/2019 Document Reviewed: 05/20/2019  [employer]  Interactive Patient Education © 2019 [employer]

## 2025-02-05 NOTE — ANESTHESIA POSTPROCEDURE EVALUATION
Patient: Dagoberto Diana    Procedure Summary       Date: 02/05/25 Room / Location: [facility] ENDOSCOPY 2 /  [facility]    Anesthesia Start: 0953 Anesthesia Stop: 1012    Procedure: ESOPHAGOGASTRODUODENOSCOPY WITH BIOPSY Diagnosis:       Heartburn      (Heartburn [R12])    Surgeons: Santiago Breaux MD Provider: Vani Castaneda CRNA    Anesthesia Type: MAC ASA Status: 1            Anesthesia Type: MAC    Vitals  Vitals Value Taken Time   /81 02/05/25 1040   Temp 98 °F (36.7 °C) 02/05/25 1012   Pulse 85 02/05/25 1041   Resp 16 02/05/25 1030   SpO2 97 % 02/05/25 1041   Vitals shown include unfiled device data.        Post Anesthesia Care and Evaluation    Patient location during evaluation: bedside  Patient participation: complete - patient participated  Level of consciousness: awake and alert  Pain score: 0  Pain management: adequate    Airway patency: patent  Anesthetic complications: No anesthetic complications  PONV Status: none  Cardiovascular status: acceptable  Respiratory status: acceptable  Hydration status: acceptable

## 2025-02-05 NOTE — H&P
"Patient Care Team:  Chris Yap MD as PCP - General (Family Medicine)    CHIEF COMPLAINT:  EGD for heartburn.     HISTORY OF PRESENT ILLNESS:  EGD for heartburn.     Past Medical History:   Diagnosis Date    Asthma     CHILDHOOD    CHI (closed head injury)     SEVERAL DURING CHILDHOOD   nO loc    Fracture of right foot     MILD HAIR LINE FX   WORE BOOT    Heartburn     for gerd WORKUP    Seasonal depression      Past Surgical History:   Procedure Laterality Date    APPENDECTOMY      HERNIA REPAIR       Family History   Problem Relation Age of Onset    Crohn's disease Mother     Crohn's disease Maternal Grandmother      Social History     Tobacco Use    Smoking status: Passive Smoke Exposure - Never Smoker    Smokeless tobacco: Never    Tobacco comments:     Very rarely smoke a single cigarette. Social/Passive use only   Vaping Use    Vaping status: Never Used   Substance Use Topics    Alcohol use: Yes     Alcohol/week: 2.0 standard drinks of alcohol     Types: 2 Shots of liquor per week     Comment: \"socially\"     Drug use: Yes     Types: Marijuana     Comment: \"socially\" last use 2 years     Medications Prior to Admission   Medication Sig Dispense Refill Last Dose/Taking    meloxicam (MOBIC) 15 MG tablet Take 1 tablet by mouth Daily. 30 tablet 5 2/4/2025 at  8:00 AM    omeprazole (priLOSEC) 20 MG capsule    2/4/2025 at  8:00 AM     Allergies:  Patient has no known allergies.    REVIEW OF SYSTEMS:  Please see the above history of present illness for pertinent positives and negatives.  The remainder of the patient's systems have been reviewed and are negative.     Vital Signs  Temp:  [98.7 °F (37.1 °C)] 98.7 °F (37.1 °C)  Heart Rate:  [90] 90  Resp:  [16] 16  BP: (149)/(92) 149/92    Flowsheet Rows      Flowsheet Row First Filed Value   Admission Height --   Admission Weight 99.8 kg (220 lb) Documented at 02/05/2025 0912             Physical Exam:  Physical Exam   Constitutional: Patient appears well-developed " and well-nourished and in no acute distress   HEENT:   Head: Normocephalic and atraumatic.   Eyes:  Pupils are equal, round, and reactive to light. EOM are intact. Sclerae are anicteric and non-injected.  Mouth and Throat: Patient has moist mucous membranes. Oropharynx is clear of any erythema or exudate.     Neck: Neck supple. No JVD present. No thyromegaly present. No lymphadenopathy present.  Cardiovascular: Regular rate, regular rhythm, S1 normal and S2 normal.  Exam reveals no gallop and no friction rub.  No murmur heard.  Pulmonary/Chest: Lungs are clear to auscultation bilaterally. No respiratory distress. No wheezes. No rhonchi. No rales.   Abdominal: Soft. Bowel sounds are normal. No distension and no mass. There is no hepatosplenomegaly. There is no tenderness.   Musculoskeletal: Normal Muscle tone  Extremities: No edema. Pulses are palpable in all 4 extremities.  Neurological: Patient is alert and oriented to person, place, and time. Cranial nerves II-XII are grossly intact with no focal deficits.  Skin: Skin is warm. No rash noted. Nails show no clubbing.  No cyanosis or erythema.    Debilities/Disabilities Identified: None  Emotional Behavior: Appropriate     Results Review:   I reviewed the patient's new clinical results.    Lab Results (most recent)       None            Imaging Results (Most Recent)       None          reviewed    ECG/EMG Results (most recent)       None          reviewed    Assessment & Plan   EGD for heartburn.  /  EGD      I discussed the patient's findings and my recommendations with patient.     Santiago Breaux MD  02/05/25  10:01 EST    Time: 10 min prior to procedure.

## 2025-02-05 NOTE — ANESTHESIA PREPROCEDURE EVALUATION
Anesthesia Evaluation     Patient summary reviewed and Nursing notes reviewed   no history of anesthetic complications:   NPO Solid Status: > 8 hours  NPO Liquid Status: > 8 hours           Airway   Mallampati: II  TM distance: <3 FB  Neck ROM: full  No difficulty expected  Dental - normal exam     Comment: Crowns secure      Pulmonary - normal exam    breath sounds clear to auscultation  (+) asthma (as a child),sleep apnea (snores)  (-) recent URI  Cardiovascular - negative cardio ROS and normal exam  Exercise tolerance: good (4-7 METS)    Rhythm: regular  Rate: normal    (-) valvular problems/murmurs, angina, DVT      Neuro/Psych  GI/Hepatic/Renal/Endo    (+) morbid obesity, GERD well controlled    Musculoskeletal     (+) arthralgias (/right knee)  (-) neck pain, neck stiffness  Abdominal   (+) obese    Abdomen: soft.   Substance History   (+) alcohol use (occassionally), drug use (MJ inhalation, rarely)     OB/GYN          Other - negative ROS                   Anesthesia Plan    ASA 1     MAC   total IV anesthesia  intravenous induction     Anesthetic plan, risks, benefits, and alternatives have been provided, discussed and informed consent has been obtained with: patient.  Pre-procedure education provided  Use of blood products discussed with patient  Consented to blood products.    Plan discussed with CRNA.    CODE STATUS:

## 2025-02-06 LAB
CYTO UR: NORMAL
LAB AP CASE REPORT: NORMAL
PATH REPORT.FINAL DX SPEC: NORMAL
PATH REPORT.GROSS SPEC: NORMAL

## 2025-02-13 ENCOUNTER — OFFICE VISIT (OUTPATIENT)
Dept: ORTHOPEDIC SURGERY | Facility: CLINIC | Age: 30
End: 2025-02-13
Payer: COMMERCIAL

## 2025-02-13 VITALS — WEIGHT: 220 LBS | HEIGHT: 64 IN | BODY MASS INDEX: 37.56 KG/M2

## 2025-02-13 DIAGNOSIS — G89.29 CHRONIC PAIN OF RIGHT KNEE: Primary | ICD-10-CM

## 2025-02-13 DIAGNOSIS — M25.561 CHRONIC PAIN OF RIGHT KNEE: Primary | ICD-10-CM

## 2025-02-13 DIAGNOSIS — M17.31 POST-TRAUMATIC OSTEOARTHRITIS OF RIGHT KNEE: ICD-10-CM

## 2025-02-13 DIAGNOSIS — Z87.828 HISTORY OF DISLOCATION OF KNEE: ICD-10-CM

## 2025-02-13 DIAGNOSIS — M23.41 BODIES, LOOSE, JOINT, KNEE, RIGHT: ICD-10-CM

## 2025-02-13 DIAGNOSIS — M25.361 PATELLAR INSTABILITY OF RIGHT KNEE: ICD-10-CM

## 2025-02-13 PROCEDURE — 1160F RVW MEDS BY RX/DR IN RCRD: CPT | Performed by: ORTHOPAEDIC SURGERY

## 2025-02-13 PROCEDURE — 1159F MED LIST DOCD IN RCRD: CPT | Performed by: ORTHOPAEDIC SURGERY

## 2025-02-13 PROCEDURE — 99214 OFFICE O/P EST MOD 30 MIN: CPT | Performed by: ORTHOPAEDIC SURGERY

## 2025-02-13 NOTE — PROGRESS NOTES
"Subjective: Bilateral knee pain right worse than left     Patient ID: Dagoberto Diana is a 29 y.o. male.    Chief Complaint:    History of Present Illness 29-year male returns to review the results of the MRI of the right knee whose images and report I reviewed.  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  Also shows evidence of loose bodies in the knee some early arthritic changes meloxicam seems to help but does not relieve all of his symptoms.       Social History     Occupational History    Not on file   Tobacco Use    Smoking status: Passive Smoke Exposure - Never Smoker     Passive exposure: Yes    Smokeless tobacco: Never    Tobacco comments:     Very rarely smoke a single cigarette. Social/Passive use only   Vaping Use    Vaping status: Never Used   Substance and Sexual Activity    Alcohol use: Yes     Alcohol/week: 2.0 standard drinks of alcohol     Types: 2 Shots of liquor per week     Comment: \"socially\"     Drug use: Not Currently     Types: Marijuana     Comment: Marijuana use once in a blue moon. Socially    Sexual activity: Yes     Partners: Female     Birth control/protection: Depo-provera      Review of Systems      Past Medical History:   Diagnosis Date    Ankle sprain     Handful of times over the years    Asthma     CHILDHOOD    CHI (closed head injury)     SEVERAL DURING CHILDHOOD   nO loc    Fracture of right foot     MILD HAIR LINE FX   WORE BOOT    Fracture, finger May 13th 2024    Heartburn     for gerd WORKUP    Knee swelling     First noticed the aching/stiff sensation in 2017    Seasonal depression      Past Surgical History:   Procedure Laterality Date    APPENDECTOMY      ENDOSCOPY N/A 2/5/2025    Procedure: ESOPHAGOGASTRODUODENOSCOPY WITH BIOPSY;  Surgeon: Santiago Breaux MD;  Location: [facility];  Service: Gastroenterology;  " Laterality: N/A;  gastric bx to r/o h pylori    HERNIA REPAIR       Family History   Problem Relation Age of Onset    Crohn's disease Mother     Crohn's disease Maternal Grandmother     Cancer Paternal Grandfather         Lung Cancer         Objective:  There were no vitals filed for this visit.      02/13/25  1259   Weight: 99.8 kg (220 lb)     Body mass index is 37.76 kg/m².           Ortho Exam   he is alert and oriented x 3.  Again he has a lateral position patella with medial parapatellar pain and discomfort.  AP instability.  Calf nontender no motor or sensory deficit.  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 This anatomic variant likely predisposes to patellofemoral instability. 2.There are subtle chronic changes involving the medial patellofemoral ligament indicating scarring secondary to partial injury from prior lateral patellofemoral dislocation. There are also subtle changes along the medial inferior margin of the patella suggesting the sequelae of prior osteochondral impaction injury. 3.Mild to moderate tricompartmental osteoarthritis which is advanced for the patient's age. There is also evidence for multifocal intra-articular loose body formation. Chronic synovial changes are also seen. The findings may be related to multiple prior patellofemoral dislocations with development of loose bodies secondary to displaced osteochondral fragments and subsequent development of early arthropathy. Changes secondary to synovial osteochondromatosis with superimposed early developing osteoarthritis could have this appearance as well. Changes secondary to PVNS are felt less likely. 4.Irregularity along the inner margin of the anterior horn of the lateral meniscus suggesting the presence of a meniscal flounce. An irregular meniscal tear is felt less likely. Electronically Signed: Serafin Ward  MD  2/5/2025 3:54 PM EST  Workstation ID: [id number]           1. Chronic pain of right knee    2. History of dislocation of knee    3. Bodies, loose, joint, knee, right    4. Patellar instability of right knee    5. Post-traumatic osteoarthritis of right knee           Plan: Reviewed with the patient his x-rays taken previously and today's images of the MRI of the knee.  He has a medial retinacular tear with loose bodies in the knee some early arthritic changes.  With the above-stated findings I think he may be a surgical candidate but I we will defer to Dr. Jaimes for his expertise.  I have fitted him for a J brace to try to get him some support until that visit can be completed.  Patient was in agreement.  Answered all questions            Work Status:    LYDIA query complete.    Orders:  No orders of the defined types were placed in this encounter.      Medications:  No orders of the defined types were placed in this encounter.      Followup:  Return in about 3 weeks (around 3/6/2025).          Dictated utilizing Dragon dictation

## 2025-02-17 NOTE — PROGRESS NOTES
- EGD for heartburn.   - Findings/path: minimal gastritis (biopsied -- negative for H Pylori), a medium amount of food residue in the stomach   - POC: No evidence of esophagitis seen. Continue Omeprazole 20 mg daily as patient reports heartburn is controlled with current medication which will also help to heal minimal gastritis seen over the course of a couple of months. If GERD persist, consider gastric emptying test given food residue noted during EGD.

## 2025-03-10 ENCOUNTER — OFFICE VISIT (OUTPATIENT)
Dept: ORTHOPEDIC SURGERY | Facility: CLINIC | Age: 30
End: 2025-03-10
Payer: COMMERCIAL

## 2025-03-10 VITALS
HEIGHT: 64 IN | SYSTOLIC BLOOD PRESSURE: 146 MMHG | BODY MASS INDEX: 38.1 KG/M2 | HEART RATE: 81 BPM | WEIGHT: 223.2 LBS | DIASTOLIC BLOOD PRESSURE: 88 MMHG

## 2025-03-10 DIAGNOSIS — M23.41 BODIES, LOOSE, JOINT, KNEE, RIGHT: ICD-10-CM

## 2025-03-10 DIAGNOSIS — M25.361 PATELLAR INSTABILITY OF RIGHT KNEE: Primary | ICD-10-CM

## 2025-03-10 DIAGNOSIS — M17.31 POST-TRAUMATIC OSTEOARTHRITIS OF RIGHT KNEE: ICD-10-CM

## 2025-03-10 PROCEDURE — 99214 OFFICE O/P EST MOD 30 MIN: CPT | Performed by: ORTHOPAEDIC SURGERY

## 2025-03-10 PROCEDURE — 1159F MED LIST DOCD IN RCRD: CPT | Performed by: ORTHOPAEDIC SURGERY

## 2025-03-10 PROCEDURE — 1160F RVW MEDS BY RX/DR IN RCRD: CPT | Performed by: ORTHOPAEDIC SURGERY

## 2025-03-10 RX ORDER — MELOXICAM 7.5 MG/1
15 TABLET ORAL ONCE
OUTPATIENT
Start: 2025-03-10 | End: 2025-03-10

## 2025-03-10 RX ORDER — ACETAMINOPHEN 325 MG/1
1000 TABLET ORAL ONCE
OUTPATIENT
Start: 2025-03-10 | End: 2025-03-10

## 2025-03-10 RX ORDER — PREGABALIN 150 MG/1
150 CAPSULE ORAL ONCE
OUTPATIENT
Start: 2025-03-10 | End: 2025-03-10

## 2025-03-10 NOTE — PROGRESS NOTES
"Subjective:     Patient ID: Dagoberto Diana is a 29 y.o. male.    Chief Complaint:  Right knee pain and instability, new patient    History of Present Illness  History of Present Illness  The patient presents to the clinic today for evaluation of recurrent right knee patella instability.    He experienced his initial episode of patella dislocation approximately 13 years ago, with a total of 5 to 6 subsequent dislocations. His primary concerns currently include a catching and locking sensation in the anterior aspect of his knee, accompanied by feelings of instability. His most recent episode occurred 3 to 4 years ago. He also reports pain in the anterior aspect of his knee, particularly during activities involving deep flexion, sitting, climbing stairs, and rising from a seated position. Associated stiffness is noted. He quantifies his pain as ranging from 4 to 7 on a scale of 10, describing it as primarily aching and dull. He reports moderate improvement in stability with the use of a J brace. He reports no hip or groin pain, fevers, chills, or sweats. Despite engaging in home exercises for over 12 weeks and taking anti-inflammatory medications, including meloxicam, he reports minimal improvement.     MEDICATIONS  Current: meloxicam     Social History     Occupational History    Not on file   Tobacco Use    Smoking status: Passive Smoke Exposure - Never Smoker     Passive exposure: Yes    Smokeless tobacco: Never    Tobacco comments:     Very rarely smoke a single cigarette. Social/Passive use only   Vaping Use    Vaping status: Never Used   Substance and Sexual Activity    Alcohol use: Yes     Alcohol/week: 2.0 standard drinks of alcohol     Types: 2 Shots of liquor per week     Comment: \"socially\"     Drug use: Not Currently     Types: Marijuana     Comment: Marijuana use once in a blue moon. Socially    Sexual activity: Yes     Partners: Female     Birth control/protection: Depo-provera      Past Medical " "History:   Diagnosis Date    Ankle sprain     Handful of times over the years    Asthma     CHILDHOOD    CHI (closed head injury)     SEVERAL DURING CHILDHOOD   nO loc    Fracture of right foot     MILD HAIR LINE FX   WORE BOOT    Fracture, finger May 13th 2024    Heartburn     for gerd WORKUP    Knee swelling     First noticed the aching/stiff sensation in 2017    Seasonal depression      Past Surgical History:   Procedure Laterality Date    APPENDECTOMY      ENDOSCOPY N/A 2/5/2025    Procedure: ESOPHAGOGASTRODUODENOSCOPY WITH BIOPSY;  Surgeon: Santiago Breaux MD;  Location: [facility] OR;  Service: Gastroenterology;  Laterality: N/A;  gastric bx to r/o h pylori    HERNIA REPAIR         Family History   Problem Relation Age of Onset    Crohn's disease Mother     Crohn's disease Maternal Grandmother     Cancer Paternal Grandfather         Lung Cancer         Review of Systems        Objective:  Vitals:    03/10/25 1030   BP: 146/88   Pulse: 81   Weight: 101 kg (223 lb 3.2 oz)   Height: 162.6 cm (64\")         03/10/25  1030   Weight: 101 kg (223 lb 3.2 oz)     Body mass index is 38.31 kg/m².  Physical Exam    Vital signs reviewed.   General: No acute distress, alert and oriented  Eyes: conjunctiva clear; pupils equally round and reactive  ENT: external ears and nose atraumatic; oropharynx clear  CV: no peripheral edema  Resp: normal respiratory effort  Skin: no rashes or wounds; normal turgor  Psych: mood and affect appropriate; recent and remote memory intact          Physical Exam  The right knee has an active range of motion from 0 to 130 degrees, with 4+ out of 5 strength on flexion and extension. A palpable loose body is present in the suprapatellar pouch extending down into the lateral recess. Positive compression test and positive patellar apprehension test are noted. There is 3.5 quadrant lateral patella laxity and 1 quadrant medial patellar laxity. Moderate effusion is observed. No medial or lateral joint line " pain is reported. Negative Iglesia exam. Grade 1A Lachman, negative anterior and posterior drawer. No hip pain on log roll. Sensation exam is negative. Straight leg raise on the right lower extremity is negative. Brisk capillary refill in all digits, 2+ dorsalis pedis pulse in the right foot.         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 This anatomic variant likely predisposes to patellofemoral instability. 2.There are subtle chronic changes involving the medial patellofemoral ligament indicating scarring secondary to partial injury from prior lateral patellofemoral dislocation. There are also subtle changes along the medial inferior margin of the patella suggesting the sequelae of prior osteochondral impaction injury. 3.Mild to moderate tricompartmental osteoarthritis which is advanced for the patient's age. There is also evidence for multifocal intra-articular loose body formation. Chronic synovial changes are also seen. The findings may be related to multiple prior patellofemoral dislocations with development of loose bodies secondary to displaced osteochondral fragments and subsequent development of early arthropathy. Changes secondary to synovial osteochondromatosis with superimposed early developing osteoarthritis could have this appearance as well. Changes secondary to PVNS are felt less likely. 4.Irregularity along the inner margin of the anterior horn of the lateral meniscus suggesting the presence of a meniscal flounce. An irregular meniscal tear is felt less likely. Electronically Signed: Serafin Ward MD  2/5/2025 3:54 PM EST  Workstation ID: [id number]      Independent visualization of outside x-rays right knee as well as MRI right knee including independent interpretation indicates evidence of patella instability with large loose body in the suprapatellar pouch and lateral recess, moderate chondral wear of  the medial patellar ridge consistent with recurrent patella instability episodes as well as significant laxity of MPFL and increased lateral tilt.  Significant trochlear dysplasia appreciated, TT-TG on my review of approximately 11.    Assessment:        1. Patellar instability of right knee    2. Bodies, loose, joint, knee, right    3. Post-traumatic osteoarthritis of right knee           Plan:          Assessment & Plan  1. Recurrent instability of the right knee patella.  He reports a history of dislocating his patella 5-6 times over the past 13 years, with the most recent episode occurring 3 to 4 years ago. He experiences catching and locking sensations, particularly with deep flexion activities, sitting, climbing stairs, and getting up from a seated position. He also notes associated stiffness and rates his pain as 4-7 out of 10, aching and dull in nature. Physical examination reveals a palpable loose body in the suprapatellar pouch extending into the lateral recess, positive compression test, positive patellar apprehension test, moderate effusion, and 3.5 quadrant lateral patella laxity. He has minimal improvement with home exercises and anti-inflammatory medications, including meloxicam.     Treatment options discussed include observation, intra-articular injection, continued patella bracing, physical therapy, and arthroscopy with loose body removal, lateral release, and MPFL reconstruction. Given his continued apprehension and pain with locking episodes, he opted for surgical treatment.     The plan is to proceed with right knee arthroscopy with loose body removal, lateral release, and MPFL reconstruction.    Plan will be for right knee arthroscopy, medial patellofemoral ligament reconstruction with allograft, lateral retinacular release, loose body removal and all associated procedures.  I explained to patient details of the procedure as well as risks, benefits, and alternatives the procedure, with risks  including but not limited to neurovascular damage, bleeding, infection, chronic pain, re-tear of the ligament repair, disease transmission,  patellofemoral compression, medial instability, recurrent lateral instability, loss of motion, weakness, stiffness, swelling, DVT, pulmonary embolus, death, stroke, complex regional pain syndrome, myocardial infarction, need for additional procedures.  Patient understood all these and had all questions answered prior to verbally consenting to proceed with surgery.  No guarantees were given in regards to results of procedure.    He has no history of deep vein thrombosis (DVT) or pulmonary embolus, cardiac history, and is not diabetic.      Dagoberto Diana was in agreement with plan and had all questions answered.     Orders:  Orders Placed This Encounter   Procedures    Basic Metabolic Panel    Protime-INR    aPTT    Chlorhexidine Skin Prep - Educate and Review With Patient    Provide Patient With ERAS Hydration Instructions    Provide Patient With Enhanced Recovery Booklet(s) or Handout    CBC & Differential       Medications:  No orders of the defined types were placed in this encounter.      Followup:  No follow-ups on file.    Diagnoses and all orders for this visit:    1. Patellar instability of right knee (Primary)  -     Case Request; Standing  -     ethyl alcohol 62 % 2 each  -     acetaminophen (TYLENOL) tablet 975 mg  -     pregabalin (LYRICA) capsule 150 mg  -     CBC & Differential; Future  -     Basic Metabolic Panel; Future  -     Protime-INR; Future  -     aPTT; Future  -     ceFAZolin (ANCEF) 2,000 mg in sodium chloride 0.9 % 100 mL IVPB  -     meloxicam (MOBIC) tablet 15 mg  -     Case Request    2. Bodies, loose, joint, knee, right  -     Case Request; Standing  -     ethyl alcohol 62 % 2 each  -     acetaminophen (TYLENOL) tablet 975 mg  -     pregabalin (LYRICA) capsule 150 mg  -     CBC & Differential; Future  -     Basic Metabolic Panel; Future  -      Protime-INR; Future  -     aPTT; Future  -     ceFAZolin (ANCEF) 2,000 mg in sodium chloride 0.9 % 100 mL IVPB  -     meloxicam (MOBIC) tablet 15 mg  -     Case Request    3. Post-traumatic osteoarthritis of right knee    Other orders  -     Follow Anesthesia Guidelines / Protocol; Future  -     Follow Anesthesia Guidelines / Protocol; Standing  -     Verify NPO Status; Standing  -     Clip operative site; Standing  -     Chlorhexidine Skin Prep - Educate and Review With Patient; Future  -     Provide Patient With ERAS Hydration Instructions  -     Provide Patient With Enhanced Recovery Booklet(s) or Handout  -     Radiology Technician to Be Present for Intra-Op C-Arm Use; Standing                  Dictated utilizing Dragon dictation     Patient or patient representative verbalized consent for the use of Ambient Listening during the visit with  Stevenson Jaimes MD for chart documentation. 3/10/2025  11:11 EDT

## 2025-04-07 ENCOUNTER — OFFICE VISIT (OUTPATIENT)
Dept: GASTROENTEROLOGY | Facility: CLINIC | Age: 30
End: 2025-04-07
Payer: COMMERCIAL

## 2025-04-07 VITALS
HEIGHT: 64 IN | WEIGHT: 222.6 LBS | BODY MASS INDEX: 38 KG/M2 | DIASTOLIC BLOOD PRESSURE: 72 MMHG | SYSTOLIC BLOOD PRESSURE: 114 MMHG

## 2025-04-07 DIAGNOSIS — K29.60 OTHER GASTRITIS WITHOUT HEMORRHAGE, UNSPECIFIED CHRONICITY: ICD-10-CM

## 2025-04-07 DIAGNOSIS — K59.04 CHRONIC IDIOPATHIC CONSTIPATION: ICD-10-CM

## 2025-04-07 DIAGNOSIS — R12 HEARTBURN: Primary | ICD-10-CM

## 2025-04-07 PROBLEM — K29.70 GASTRITIS WITHOUT BLEEDING: Status: ACTIVE | Noted: 2025-04-07

## 2025-04-07 PROCEDURE — 1159F MED LIST DOCD IN RCRD: CPT

## 2025-04-07 PROCEDURE — 1160F RVW MEDS BY RX/DR IN RCRD: CPT

## 2025-04-07 PROCEDURE — 99214 OFFICE O/P EST MOD 30 MIN: CPT

## 2025-04-07 NOTE — PROGRESS NOTES
Patient or patient representative verbalized consent for the use of Ambient Listening during the visit with  CODEY Tamayo for chart documentation. 4/7/2025  14:30 EDT    Chief Complaint   Patient presents with    Gastritis         Patient is a 29 y.o. who presents to the office for follow-up after undergoing EGD evaluation.  Patient has a significant past medical history of asthma, heartburn, arthralgias.     2/5/2025 EGD:   Normal-appearing esophagus  Nonerosive gastritis  Path: Negative for H. pylori or intestinal metaplasia  Medium amount of food residue in gastric body  Normal-appearing duodenal  Recommend considering GES if GERD persist after EGD evaluation given present of food bezoar     Past Surgical History:  Appendectomy     Social History:  Former tobacco user  Reports social alcohol consumption  Occasional marijuana use     Family history:  Crohn's disease in mother    History of Present Illness  The patient presents for evaluation of gastritis and increased flatulence.    Gastritis  - Diagnosed with gastritis, characterized by inflammation of the gastric mucosa  - Experiences pyrosis and gastroesophageal reflux despite the absence of a bacterial infection  - No symptoms of early satiety or nausea  - Symptoms effectively managed with Omeprazole 20 mg  - Immediate recurrence of symptoms with any lapse in medication  - He continues on meloxicam 15 mg daily for arthralgias    Flatulence  - Slight increase in flatulence  - Regular bowel movements, typically once or twice daily, which are complete and timely  - Denies unintentional weight loss or nocturnal bowel movements    Supplemental information: The patient expresses concern about potential underlying conditions due to a family history of Crohn's disease and is interested in early diagnostic testing options.    FAMILY HISTORY  - Mother has a history of Crohn's disease             Current/Previous treatment for GERD  Current:  Omeprazole 20 mg  "daily    Medications    Current Outpatient Medications:     meloxicam (MOBIC) 15 MG tablet, Take 1 tablet by mouth Daily., Disp: 30 tablet, Rfl: 5    omeprazole (priLOSEC) 20 MG capsule, , Disp: , Rfl:   Result Review :      Tissue Pathology Exam (02/05/2025 10:06)   Upper GI Endoscopy (02/05/2025 09:50)   Office Visit with Aimee Alcazar APRN (12/30/2024)   Vital Signs:   /72 (BP Location: Left arm, Patient Position: Sitting, Cuff Size: Large Adult)   Ht 162.6 cm (64.02\")   Wt 101 kg (222 lb 9.6 oz)   BMI 38.19 kg/m²     Body mass index is 38.19 kg/m².      Physical Exam  Constitutional:       General: He is not in acute distress.     Appearance: Normal appearance. He is not ill-appearing.   HENT:      Head: Normocephalic.   Eyes:      General: No scleral icterus.  Pulmonary:      Effort: No respiratory distress.   Abdominal:      General: Abdomen is flat. Bowel sounds are normal. There is no distension.      Palpations: Abdomen is soft. There is no mass.      Tenderness: There is no abdominal tenderness. There is no guarding or rebound.      Hernia: No hernia is present.   Skin:     General: Skin is warm and dry.   Neurological:      Mental Status: He is alert.      Gait: Gait normal.   Psychiatric:         Mood and Affect: Mood normal.       Assessment and Plan    Diagnoses and all orders for this visit:    1. Heartburn (Primary)    2. Other gastritis without hemorrhage, unspecified chronicity    3. Chronic idiopathic constipation       Assessment & Plan  1. Gastritis  - Likely due to environmental factors, including dietary habits and lifestyle  - Meloxicam may contribute to irritation  - No typical symptoms of delayed gastric emptying despite bezoar presence  - Inflammation expected to resolve in 8-12 weeks  - Continue omeprazole 20 mg while on meloxicam or other anti-inflammatories  - Investigate further if early satiety, nausea, unintentional weight loss, or severe symptoms occur    2.  Bowel " habits  - Advised to take magnesium at night and fiber supplement in the morning to help promote complete bowel movements  - He will notify our office if he develops constipation after undergoing upcoming knee surgery for further recommendations.      3. Family history of Crohn's disease  - Explained no precursor test for Crohn's  - Monitor for symptoms like nighttime bowel movements, unintentional weight loss, diarrhea, and rectal bleeding for aggressive investigation    Follow-up  - Follow up in 6 months    Patient is agreeable to the outlined above treatment plan.  Verbalizes understanding and will contact office for any new or worsening concerns.  All questions answered and support provided.  Patient Instructions   Ways to help reduce heartburn symptoms:    continue Prilosec/Omeprazole 20 mg daily prior to breakfast  Avoid eating late night snacks within 3 hours of going to bed  If you have increased abdominal body weight, lifestyle changes to improve weight can help with heartburn symtoms  If you use tobacco products, we recommend that you stop smoking including e-cigarettes  Research has proven that people with heartburn who use tobacco can reduce heartburn symptoms by 44% after they stop smoking.   Sleep on your left side  Sleeping with your head/upper body elevated with a wedge pillow or with the head of the bed inclined   Avoid foods that can trigger symptoms which may include:  citrus fruits  spicy foods,  Tomatoes and Red sauces   Chocolate  coffee/tea  caffeinated or carbonated beverages  Alcohol  High fat foods  peppermint    Call me if you develop nausea, early fullness, or worsening heartburn to consider:  We recommend assessing a a Gastric Emptying Study   The test itself will take approximately 4 hours  To perform the test hospital staff will have you consume eggs with tracers and obtain imaging after meal ingestion and again at one, two, and four hours following ingestion of eggs  If you have been  started on medications that affect gastric emptying such as erythromycin or Reglan (metoclopramide) these will need to be held for at least 48 hours prior to completion of testing.      For Constipation:    Goal of constipation regimen is to produce at least 3 complete bowel movements per week   Recommend starting a soluble fiber regimen that includes psyllium such as Metamucil.    This helps to keep stool soft and formed and easy transit throughout the colon to produce regular and complete bowel movements.  Consume at least 20 to 30 g of dietary fiber per day  Research has proven that consuming 5 prunes or 2 kiwi fruit per day can also aid in reducing constipation.  When starting fiber regimen recommend starting with a low-dose and gradually increasing by 1 tablespoon every 7 days as tolerated.    This will help your body acclimate to the higher fiber diet and reduce risk of unwanted side effects that include bloating, gas, abdominal fullness, and cramping  For example: Begin taking 1 tablespoon daily for at least 7 days, if this is not successful in producing regular bowel movements can begin taking 1 tablespoons twice a day, and finally if this is not successful after taking 2 tablespoons for 7 days, can further increase to maximum recommended dosing of 1 tablespoon 3 times per day.  If you choose capsule formulation:  Begin taking 3 capsules daily in the morning with at least 8 ounces of water, then increase by 1 capsule every 7 days until stool becomes soft and bowel movements are complete and sensation.  Do not exceed maximum package dosing  It is important to consume increased amounts of fluid throughout the day to help improve the effectiveness of the fiber supplementation  Avoid gummy formulations of fiber as this can further increase your risk of the above adverse effects due to artificial sweeteners in the Gummies.    We recommend starting/continuing a nightly over the counter Magnesium supplement to help  with bowel regularity     How Does Magnesium Help with Constipation?  Water Absorption:   Magnesium helps your small intestine absorb water, which increases the fluid in your intestines  Stimulating Bowel Movements:   This additional fluid helps soften stools and stimulates the pulsing movement of the intestines (peristalsis), making it easier to have bowel movements    Types of Magnesium for Constipation  Magnesium Citrate:   Often used as a laxative, it is available in liquid and pill form and works quickly.  Liquid form is most commonly used in bowel preparation.  Tablet formulation can be used long-term.  Magnesium Hydroxide:   Commonly known as milk of magnesia, it is available in liquid or tablet form  Magnesium Oxide:   Available in tablet form, it is less commonly used for constipation but can be effective    Dosage and Administration  Dosage:   Follow the dosage instructions on the product label.  The typical dose for constipation relief is 240-480 mg per day  Timing:   Take magnesium supplements with a full glass of water  Consistency:   Take it at the same time each day (preferably in the evening) to help remember your dose     EMR Dragon/Transcription Disclaimer:  This document has been Dictated utilizing Dragon dictation.     Aimee Alcazar, MSN, APRN, FNP-C   [facility]  Gastroenterology   [address] [facility]  [place] [place], [place] [id number] [phone] office  [phone] fax

## 2025-04-07 NOTE — PATIENT INSTRUCTIONS
Ways to help reduce heartburn symptoms:    continue Prilosec/Omeprazole 20 mg daily prior to breakfast  Avoid eating late night snacks within 3 hours of going to bed  If you have increased abdominal body weight, lifestyle changes to improve weight can help with heartburn symtoms  If you use tobacco products, we recommend that you stop smoking including e-cigarettes  Research has proven that people with heartburn who use tobacco can reduce heartburn symptoms by 44% after they stop smoking.   Sleep on your left side  Sleeping with your head/upper body elevated with a wedge pillow or with the head of the bed inclined   Avoid foods that can trigger symptoms which may include:  citrus fruits  spicy foods,  Tomatoes and Red sauces   Chocolate  coffee/tea  caffeinated or carbonated beverages  Alcohol  High fat foods  peppermint    Call me if you develop nausea, early fullness, or worsening heartburn to consider:  We recommend assessing a a Gastric Emptying Study   The test itself will take approximately 4 hours  To perform the test hospital staff will have you consume eggs with tracers and obtain imaging after meal ingestion and again at one, two, and four hours following ingestion of eggs  If you have been started on medications that affect gastric emptying such as erythromycin or Reglan (metoclopramide) these will need to be held for at least 48 hours prior to completion of testing.      For Constipation:    Goal of constipation regimen is to produce at least 3 complete bowel movements per week   Recommend starting a soluble fiber regimen that includes psyllium such as Metamucil.    This helps to keep stool soft and formed and easy transit throughout the colon to produce regular and complete bowel movements.  Consume at least 20 to 30 g of dietary fiber per day  Research has proven that consuming 5 prunes or 2 kiwi fruit per day can also aid in reducing constipation.  When starting fiber regimen recommend starting with a  low-dose and gradually increasing by 1 tablespoon every 7 days as tolerated.    This will help your body acclimate to the higher fiber diet and reduce risk of unwanted side effects that include bloating, gas, abdominal fullness, and cramping  For example: Begin taking 1 tablespoon daily for at least 7 days, if this is not successful in producing regular bowel movements can begin taking 1 tablespoons twice a day, and finally if this is not successful after taking 2 tablespoons for 7 days, can further increase to maximum recommended dosing of 1 tablespoon 3 times per day.  If you choose capsule formulation:  Begin taking 3 capsules daily in the morning with at least 8 ounces of water, then increase by 1 capsule every 7 days until stool becomes soft and bowel movements are complete and sensation.  Do not exceed maximum package dosing  It is important to consume increased amounts of fluid throughout the day to help improve the effectiveness of the fiber supplementation  Avoid gummy formulations of fiber as this can further increase your risk of the above adverse effects due to artificial sweeteners in the Gummies.    We recommend starting/continuing a nightly over the counter Magnesium supplement to help with bowel regularity     How Does Magnesium Help with Constipation?  Water Absorption:   Magnesium helps your small intestine absorb water, which increases the fluid in your intestines  Stimulating Bowel Movements:   This additional fluid helps soften stools and stimulates the pulsing movement of the intestines (peristalsis), making it easier to have bowel movements    Types of Magnesium for Constipation  Magnesium Citrate:   Often used as a laxative, it is available in liquid and pill form and works quickly.  Liquid form is most commonly used in bowel preparation.  Tablet formulation can be used long-term.  Magnesium Hydroxide:   Commonly known as milk of magnesia, it is available in liquid or tablet form  Magnesium  Oxide:   Available in tablet form, it is less commonly used for constipation but can be effective    Dosage and Administration  Dosage:   Follow the dosage instructions on the product label.  The typical dose for constipation relief is 240-480 mg per day  Timing:   Take magnesium supplements with a full glass of water  Consistency:   Take it at the same time each day (preferably in the evening) to help remember your dose

## 2025-06-09 ENCOUNTER — ANESTHESIA EVENT (OUTPATIENT)
Dept: PERIOP | Facility: HOSPITAL | Age: 30
End: 2025-06-09
Payer: COMMERCIAL

## 2025-06-25 ENCOUNTER — TELEPHONE (OUTPATIENT)
Dept: ORTHOPEDIC SURGERY | Facility: CLINIC | Age: 30
End: 2025-06-25

## 2025-06-25 NOTE — TELEPHONE ENCOUNTER
"Caller: Dagoberto Diana \"Gary\"    Relationship to patient: Self    Best call back number: [phone] (home)     Patient is needing: PATIENT WANTS TO KNOW IF THE R KNEE SX HE IS HAVING ON 6/27/25 IS GOING TO BE INPATIENT OR OUTPATIENT   HE IS NEEDING TO PLAN WHAT ALL HE NEEDS TO PACK FOR THE STAY   HE WOULD LIKE A MESSAGE BACK ON [facility]     "

## 2025-06-27 ENCOUNTER — APPOINTMENT (OUTPATIENT)
Dept: ULTRASOUND IMAGING | Facility: HOSPITAL | Age: 30
End: 2025-06-27
Payer: COMMERCIAL

## 2025-06-27 ENCOUNTER — APPOINTMENT (OUTPATIENT)
Dept: GENERAL RADIOLOGY | Facility: HOSPITAL | Age: 30
End: 2025-06-27
Payer: COMMERCIAL

## 2025-06-27 ENCOUNTER — ANESTHESIA (OUTPATIENT)
Dept: PERIOP | Facility: HOSPITAL | Age: 30
End: 2025-06-27
Payer: COMMERCIAL

## 2025-06-27 ENCOUNTER — HOSPITAL ENCOUNTER (OUTPATIENT)
Facility: HOSPITAL | Age: 30
Setting detail: HOSPITAL OUTPATIENT SURGERY
Discharge: HOME | End: 2025-06-27
Attending: ORTHOPAEDIC SURGERY | Admitting: ORTHOPAEDIC SURGERY
Payer: COMMERCIAL

## 2025-06-27 VITALS
DIASTOLIC BLOOD PRESSURE: 84 MMHG | SYSTOLIC BLOOD PRESSURE: 116 MMHG | WEIGHT: 210.6 LBS | BODY MASS INDEX: 36.13 KG/M2 | RESPIRATION RATE: 14 BRPM | HEART RATE: 108 BPM | OXYGEN SATURATION: 98 % | TEMPERATURE: 98.3 F

## 2025-06-27 DIAGNOSIS — M25.361 PATELLAR INSTABILITY OF RIGHT KNEE: ICD-10-CM

## 2025-06-27 DIAGNOSIS — M23.41 BODIES, LOOSE, JOINT, KNEE, RIGHT: ICD-10-CM

## 2025-06-27 LAB
ANION GAP SERPL CALCULATED.3IONS-SCNC: 12.6 MMOL/L (ref 5–15)
APTT PPP: 25.8 SECONDS (ref 24.3–38.1)
BASOPHILS # BLD AUTO: 0.04 10*3/MM3 (ref 0–0.2)
BASOPHILS NFR BLD AUTO: 0.6 % (ref 0–1.5)
BUN SERPL-MCNC: 14.5 MG/DL (ref 6–20)
BUN/CREAT SERPL: 18.1 (ref 7–25)
CALCIUM SPEC-SCNC: 9.5 MG/DL (ref 8.6–10.5)
CHLORIDE SERPL-SCNC: 104 MMOL/L (ref 98–107)
CO2 SERPL-SCNC: 23.4 MMOL/L (ref 22–29)
CREAT SERPL-MCNC: 0.8 MG/DL (ref 0.76–1.27)
DEPRECATED RDW RBC AUTO: 38.8 FL (ref 37–54)
EGFRCR SERPLBLD CKD-EPI 2021: 124.4 ML/MIN/1.73
EOSINOPHIL # BLD AUTO: 0.21 10*3/MM3 (ref 0–0.4)
EOSINOPHIL NFR BLD AUTO: 3 % (ref 0.3–6.2)
ERYTHROCYTE [DISTWIDTH] IN BLOOD BY AUTOMATED COUNT: 13.2 % (ref 12.3–15.4)
GLUCOSE SERPL-MCNC: 100 MG/DL (ref 65–99)
HCT VFR BLD AUTO: 47.1 % (ref 37.5–51)
HGB BLD-MCNC: 16.1 G/DL (ref 13–17.7)
IMM GRANULOCYTES # BLD AUTO: 0.03 10*3/MM3 (ref 0–0.05)
IMM GRANULOCYTES NFR BLD AUTO: 0.4 % (ref 0–0.5)
INR PPP: 0.97 (ref 0.9–1.1)
LYMPHOCYTES # BLD AUTO: 2.57 10*3/MM3 (ref 0.7–3.1)
LYMPHOCYTES NFR BLD AUTO: 36.6 % (ref 19.6–45.3)
MCH RBC QN AUTO: 28.1 PG (ref 26.6–33)
MCHC RBC AUTO-ENTMCNC: 34.2 G/DL (ref 31.5–35.7)
MCV RBC AUTO: 82.3 FL (ref 79–97)
MONOCYTES # BLD AUTO: 0.61 10*3/MM3 (ref 0.1–0.9)
MONOCYTES NFR BLD AUTO: 8.7 % (ref 5–12)
NEUTROPHILS NFR BLD AUTO: 3.56 10*3/MM3 (ref 1.7–7)
NEUTROPHILS NFR BLD AUTO: 50.7 % (ref 42.7–76)
NRBC BLD AUTO-RTO: 0 /100 WBC (ref 0–0.2)
PLATELET # BLD AUTO: 264 10*3/MM3 (ref 140–450)
PMV BLD AUTO: 10.4 FL (ref 6–12)
POTASSIUM SERPL-SCNC: 3.9 MMOL/L (ref 3.5–5.2)
PROTHROMBIN TIME: 13 SECONDS (ref 12.1–15)
RBC # BLD AUTO: 5.72 10*6/MM3 (ref 4.14–5.8)
SODIUM SERPL-SCNC: 140 MMOL/L (ref 136–145)
WBC NRBC COR # BLD AUTO: 7.02 10*3/MM3 (ref 3.4–10.8)

## 2025-06-27 PROCEDURE — 25010000002 FENTANYL CITRATE (PF) 50 MCG/ML SOLUTION

## 2025-06-27 PROCEDURE — 25010000002 FAMOTIDINE 10 MG/ML SOLUTION: Performed by: NURSE ANESTHETIST, CERTIFIED REGISTERED

## 2025-06-27 PROCEDURE — 25010000002 PROPOFOL 200 MG/20ML EMULSION

## 2025-06-27 PROCEDURE — 25810000003 LACTATED RINGERS PER 1000 ML: Performed by: NURSE ANESTHETIST, CERTIFIED REGISTERED

## 2025-06-27 PROCEDURE — 25010000002 LIDOCAINE 2% SOLUTION

## 2025-06-27 PROCEDURE — 80048 BASIC METABOLIC PNL TOTAL CA: CPT | Performed by: ORTHOPAEDIC SURGERY

## 2025-06-27 PROCEDURE — C1713 ANCHOR/SCREW BN/BN,TIS/BN: HCPCS | Performed by: ORTHOPAEDIC SURGERY

## 2025-06-27 PROCEDURE — 25010000002 BUPIVACAINE (PF) 0.25 % SOLUTION

## 2025-06-27 PROCEDURE — 25010000002 LIDOCAINE PF 1% 1 % SOLUTION

## 2025-06-27 PROCEDURE — 73560 X-RAY EXAM OF KNEE 1 OR 2: CPT

## 2025-06-27 PROCEDURE — 27422 REVISION OF UNSTABLE KNEECAP: CPT | Performed by: ORTHOPAEDIC SURGERY

## 2025-06-27 PROCEDURE — 25010000002 HYDROMORPHONE 1 MG/ML SOLUTION

## 2025-06-27 PROCEDURE — 85610 PROTHROMBIN TIME: CPT | Performed by: ORTHOPAEDIC SURGERY

## 2025-06-27 PROCEDURE — 25010000002 ONDANSETRON PER 1 MG

## 2025-06-27 PROCEDURE — 85730 THROMBOPLASTIN TIME PARTIAL: CPT | Performed by: ORTHOPAEDIC SURGERY

## 2025-06-27 PROCEDURE — 25010000002 DEXAMETHASONE PER 1 MG: Performed by: NURSE ANESTHETIST, CERTIFIED REGISTERED

## 2025-06-27 PROCEDURE — 25810000003 SODIUM CHLORIDE PER 500 ML: Performed by: ORTHOPAEDIC SURGERY

## 2025-06-27 PROCEDURE — 25010000002 METOCLOPRAMIDE PER 10 MG: Performed by: NURSE ANESTHETIST, CERTIFIED REGISTERED

## 2025-06-27 PROCEDURE — 85025 COMPLETE CBC W/AUTO DIFF WBC: CPT | Performed by: ORTHOPAEDIC SURGERY

## 2025-06-27 PROCEDURE — 29874 ARTHRS KNEE SURG RMV LOOS/FB: CPT | Performed by: ORTHOPAEDIC SURGERY

## 2025-06-27 PROCEDURE — 25010000002 MIDAZOLAM PER 1MG: Performed by: NURSE ANESTHETIST, CERTIFIED REGISTERED

## 2025-06-27 PROCEDURE — L1832 KO ADJ JNT POS R SUP PRE CST: HCPCS | Performed by: ORTHOPAEDIC SURGERY

## 2025-06-27 PROCEDURE — 76000 FLUOROSCOPY <1 HR PHYS/QHP: CPT

## 2025-06-27 PROCEDURE — 25010000002 ONDANSETRON PER 1 MG: Performed by: NURSE ANESTHETIST, CERTIFIED REGISTERED

## 2025-06-27 PROCEDURE — 25010000002 CEFAZOLIN PER 500 MG: Performed by: ORTHOPAEDIC SURGERY

## 2025-06-27 DEVICE — IMPLANTABLE DEVICE: Type: IMPLANTABLE DEVICE | Site: KNEE | Status: FUNCTIONAL

## 2025-06-27 DEVICE — BLUE BRAIDED UHMWPE SIZE 2 LOOPED 20" GR51 NEEDLE S&N
Type: IMPLANTABLE DEVICE | Site: KNEE | Status: FUNCTIONAL
Brand: FORCE FIBER

## 2025-06-27 DEVICE — ULTRABRAID NO.2 WHITE SUTURE AND                                    NEEDLE ASSEMBLY, 38 INCH, 10 PER                                    BOX, STERILE
Type: IMPLANTABLE DEVICE | Site: KNEE | Status: FUNCTIONAL
Brand: ULTRABRAID

## 2025-06-27 DEVICE — WHITE BLUE UHMWPE CO-BRAID SIZE 2 LOOPED 20" GR51 NEEDLE S&N
Type: IMPLANTABLE DEVICE | Site: KNEE | Status: FUNCTIONAL
Brand: FORCE FIBER

## 2025-06-27 DEVICE — BIOSURE REGENSORB INTERFERENCE                                    SCREW 7 MM X 20MM
Type: IMPLANTABLE DEVICE | Site: KNEE | Status: FUNCTIONAL
Brand: BIOSURE

## 2025-06-27 RX ORDER — ONDANSETRON 2 MG/ML
4 INJECTION INTRAMUSCULAR; INTRAVENOUS ONCE AS NEEDED
Status: COMPLETED | OUTPATIENT
Start: 2025-06-27 | End: 2025-06-27

## 2025-06-27 RX ORDER — DEXMEDETOMIDINE HYDROCHLORIDE 100 UG/ML
INJECTION, SOLUTION INTRAVENOUS
Status: COMPLETED | OUTPATIENT
Start: 2025-06-27 | End: 2025-06-27

## 2025-06-27 RX ORDER — FENTANYL CITRATE 50 UG/ML
INJECTION, SOLUTION INTRAMUSCULAR; INTRAVENOUS AS NEEDED
Status: DISCONTINUED | OUTPATIENT
Start: 2025-06-27 | End: 2025-06-27 | Stop reason: SURG

## 2025-06-27 RX ORDER — DEXAMETHASONE SODIUM PHOSPHATE 4 MG/ML
4 INJECTION, SOLUTION INTRA-ARTICULAR; INTRALESIONAL; INTRAMUSCULAR; INTRAVENOUS; SOFT TISSUE ONCE AS NEEDED
Status: COMPLETED | OUTPATIENT
Start: 2025-06-27 | End: 2025-06-27

## 2025-06-27 RX ORDER — LIDOCAINE HYDROCHLORIDE 10 MG/ML
INJECTION, SOLUTION EPIDURAL; INFILTRATION; INTRACAUDAL; PERINEURAL
Status: COMPLETED | OUTPATIENT
Start: 2025-06-27 | End: 2025-06-27

## 2025-06-27 RX ORDER — SODIUM CHLORIDE 9 MG/ML
INJECTION, SOLUTION INTRAVENOUS AS NEEDED
Status: DISCONTINUED | OUTPATIENT
Start: 2025-06-27 | End: 2025-06-27 | Stop reason: HOSPADM

## 2025-06-27 RX ORDER — MINERAL OIL
OIL (ML) MISCELLANEOUS AS NEEDED
Status: DISCONTINUED | OUTPATIENT
Start: 2025-06-27 | End: 2025-06-27 | Stop reason: HOSPADM

## 2025-06-27 RX ORDER — ONDANSETRON 4 MG/1
4 TABLET, FILM COATED ORAL EVERY 8 HOURS PRN
Qty: 20 TABLET | Refills: 0 | Status: SHIPPED | OUTPATIENT
Start: 2025-06-27

## 2025-06-27 RX ORDER — SODIUM CHLORIDE 0.9 % (FLUSH) 0.9 %
10 SYRINGE (ML) INJECTION AS NEEDED
Status: DISCONTINUED | OUTPATIENT
Start: 2025-06-27 | End: 2025-06-27 | Stop reason: HOSPADM

## 2025-06-27 RX ORDER — MELOXICAM 7.5 MG/1
15 TABLET ORAL ONCE
Status: COMPLETED | OUTPATIENT
Start: 2025-06-27 | End: 2025-06-27

## 2025-06-27 RX ORDER — SENNOSIDES 8.6 MG
325 CAPSULE ORAL 2 TIMES DAILY
Qty: 42 TABLET | Refills: 0 | Status: SHIPPED | OUTPATIENT
Start: 2025-06-27

## 2025-06-27 RX ORDER — SODIUM CHLORIDE, SODIUM LACTATE, POTASSIUM CHLORIDE, CALCIUM CHLORIDE 600; 310; 30; 20 MG/100ML; MG/100ML; MG/100ML; MG/100ML
100 INJECTION, SOLUTION INTRAVENOUS ONCE
Status: DISCONTINUED | OUTPATIENT
Start: 2025-06-27 | End: 2025-06-27 | Stop reason: HOSPADM

## 2025-06-27 RX ORDER — LIDOCAINE HYDROCHLORIDE 10 MG/ML
0.5 INJECTION, SOLUTION EPIDURAL; INFILTRATION; INTRACAUDAL; PERINEURAL ONCE AS NEEDED
Status: DISCONTINUED | OUTPATIENT
Start: 2025-06-27 | End: 2025-06-27 | Stop reason: HOSPADM

## 2025-06-27 RX ORDER — FENTANYL CITRATE 50 UG/ML
25 INJECTION, SOLUTION INTRAMUSCULAR; INTRAVENOUS
Status: DISCONTINUED | OUTPATIENT
Start: 2025-06-27 | End: 2025-06-27 | Stop reason: HOSPADM

## 2025-06-27 RX ORDER — BUPIVACAINE HYDROCHLORIDE 2.5 MG/ML
INJECTION, SOLUTION EPIDURAL; INFILTRATION; INTRACAUDAL; PERINEURAL
Status: COMPLETED | OUTPATIENT
Start: 2025-06-27 | End: 2025-06-27

## 2025-06-27 RX ORDER — HYDROCODONE BITARTRATE AND ACETAMINOPHEN 7.5; 325 MG/1; MG/1
1 TABLET ORAL ONCE AS NEEDED
Status: COMPLETED | OUTPATIENT
Start: 2025-06-27 | End: 2025-06-27

## 2025-06-27 RX ORDER — SODIUM CHLORIDE, SODIUM LACTATE, POTASSIUM CHLORIDE, CALCIUM CHLORIDE 600; 310; 30; 20 MG/100ML; MG/100ML; MG/100ML; MG/100ML
9 INJECTION, SOLUTION INTRAVENOUS CONTINUOUS PRN
Status: DISCONTINUED | OUTPATIENT
Start: 2025-06-27 | End: 2025-06-27 | Stop reason: HOSPADM

## 2025-06-27 RX ORDER — METOCLOPRAMIDE HYDROCHLORIDE 5 MG/ML
10 INJECTION INTRAMUSCULAR; INTRAVENOUS ONCE
Status: COMPLETED | OUTPATIENT
Start: 2025-06-27 | End: 2025-06-27

## 2025-06-27 RX ORDER — SODIUM CHLORIDE 0.9 % (FLUSH) 0.9 %
10 SYRINGE (ML) INJECTION EVERY 12 HOURS SCHEDULED
Status: DISCONTINUED | OUTPATIENT
Start: 2025-06-27 | End: 2025-06-27 | Stop reason: HOSPADM

## 2025-06-27 RX ORDER — OXYCODONE AND ACETAMINOPHEN 5; 325 MG/1; MG/1
1 TABLET ORAL EVERY 4 HOURS PRN
Qty: 42 TABLET | Refills: 0 | Status: SHIPPED | OUTPATIENT
Start: 2025-06-27

## 2025-06-27 RX ORDER — LIDOCAINE HYDROCHLORIDE 20 MG/ML
INJECTION, SOLUTION INFILTRATION; PERINEURAL AS NEEDED
Status: DISCONTINUED | OUTPATIENT
Start: 2025-06-27 | End: 2025-06-27 | Stop reason: SURG

## 2025-06-27 RX ORDER — SODIUM CHLORIDE 9 MG/ML
40 INJECTION, SOLUTION INTRAVENOUS AS NEEDED
Status: DISCONTINUED | OUTPATIENT
Start: 2025-06-27 | End: 2025-06-27 | Stop reason: HOSPADM

## 2025-06-27 RX ORDER — PROPOFOL 10 MG/ML
INJECTION, EMULSION INTRAVENOUS AS NEEDED
Status: DISCONTINUED | OUTPATIENT
Start: 2025-06-27 | End: 2025-06-27 | Stop reason: SURG

## 2025-06-27 RX ORDER — MIDAZOLAM HYDROCHLORIDE 2 MG/2ML
1 INJECTION, SOLUTION INTRAMUSCULAR; INTRAVENOUS
Status: DISCONTINUED | OUTPATIENT
Start: 2025-06-27 | End: 2025-06-27 | Stop reason: HOSPADM

## 2025-06-27 RX ORDER — KETAMINE HYDROCHLORIDE 10 MG/ML
INJECTION, SOLUTION INTRAMUSCULAR; INTRAVENOUS AS NEEDED
Status: DISCONTINUED | OUTPATIENT
Start: 2025-06-27 | End: 2025-06-27 | Stop reason: SURG

## 2025-06-27 RX ORDER — FAMOTIDINE 10 MG/ML
20 INJECTION, SOLUTION INTRAVENOUS
Status: COMPLETED | OUTPATIENT
Start: 2025-06-27 | End: 2025-06-27

## 2025-06-27 RX ORDER — PREGABALIN 75 MG/1
150 CAPSULE ORAL ONCE
Status: COMPLETED | OUTPATIENT
Start: 2025-06-27 | End: 2025-06-27

## 2025-06-27 RX ORDER — MAGNESIUM HYDROXIDE 1200 MG/15ML
LIQUID ORAL AS NEEDED
Status: DISCONTINUED | OUTPATIENT
Start: 2025-06-27 | End: 2025-06-27 | Stop reason: HOSPADM

## 2025-06-27 RX ORDER — ACETAMINOPHEN 500 MG
1000 TABLET ORAL ONCE
Status: COMPLETED | OUTPATIENT
Start: 2025-06-27 | End: 2025-06-27

## 2025-06-27 RX ADMIN — MELOXICAM 15 MG: 7.5 TABLET ORAL at 09:28

## 2025-06-27 RX ADMIN — FENTANYL CITRATE 25 MCG: 50 INJECTION, SOLUTION INTRAMUSCULAR; INTRAVENOUS at 14:16

## 2025-06-27 RX ADMIN — MIDAZOLAM HYDROCHLORIDE 2 MG: 1 INJECTION, SOLUTION INTRAMUSCULAR; INTRAVENOUS at 09:36

## 2025-06-27 RX ADMIN — BUPIVACAINE HYDROCHLORIDE 20 ML: 2.5 INJECTION, SOLUTION EPIDURAL; INFILTRATION; INTRACAUDAL; PERINEURAL at 09:44

## 2025-06-27 RX ADMIN — KETAMINE HYDROCHLORIDE 10 MG: 10 INJECTION, SOLUTION INTRAMUSCULAR; INTRAVENOUS at 11:55

## 2025-06-27 RX ADMIN — DEXMEDETOMIDINE HYDROCHLORIDE 20 MCG: 100 INJECTION, SOLUTION INTRAVENOUS at 09:44

## 2025-06-27 RX ADMIN — DEXMEDETOMIDINE 30 MCG: 100 INJECTION, SOLUTION, CONCENTRATE INTRAVENOUS at 09:42

## 2025-06-27 RX ADMIN — ONDANSETRON 4 MG: 2 INJECTION, SOLUTION INTRAMUSCULAR; INTRAVENOUS at 14:34

## 2025-06-27 RX ADMIN — DEXAMETHASONE SODIUM PHOSPHATE 4 MG: 4 INJECTION, SOLUTION INTRA-ARTICULAR; INTRALESIONAL; INTRAMUSCULAR; INTRAVENOUS; SOFT TISSUE at 09:26

## 2025-06-27 RX ADMIN — KETAMINE HYDROCHLORIDE 10 MG: 10 INJECTION, SOLUTION INTRAMUSCULAR; INTRAVENOUS at 12:24

## 2025-06-27 RX ADMIN — PROPOFOL 200 MG: 10 INJECTION, EMULSION INTRAVENOUS at 11:55

## 2025-06-27 RX ADMIN — BUPIVACAINE HYDROCHLORIDE 10 ML: 2.5 INJECTION, SOLUTION EPIDURAL; INFILTRATION; INTRACAUDAL; PERINEURAL at 09:42

## 2025-06-27 RX ADMIN — KETAMINE HYDROCHLORIDE 10 MG: 10 INJECTION, SOLUTION INTRAMUSCULAR; INTRAVENOUS at 12:56

## 2025-06-27 RX ADMIN — LIDOCAINE HYDROCHLORIDE 100 MG: 20 INJECTION, SOLUTION INFILTRATION; PERINEURAL at 11:55

## 2025-06-27 RX ADMIN — LIDOCAINE HYDROCHLORIDE 2 MG: 10 INJECTION, SOLUTION EPIDURAL; INFILTRATION; INTRACAUDAL; PERINEURAL at 09:43

## 2025-06-27 RX ADMIN — METOCLOPRAMIDE 10 MG: 5 INJECTION, SOLUTION INTRAMUSCULAR; INTRAVENOUS at 15:36

## 2025-06-27 RX ADMIN — FAMOTIDINE 20 MG: 10 INJECTION INTRAVENOUS at 09:26

## 2025-06-27 RX ADMIN — FENTANYL CITRATE 50 MCG: 50 INJECTION INTRAMUSCULAR; INTRAVENOUS at 12:17

## 2025-06-27 RX ADMIN — LIDOCAINE HYDROCHLORIDE 2 MG: 10 INJECTION, SOLUTION EPIDURAL; INFILTRATION; INTRACAUDAL; PERINEURAL at 09:40

## 2025-06-27 RX ADMIN — CEFAZOLIN 2000 MG: 2 INJECTION, POWDER, FOR SOLUTION INTRAMUSCULAR; INTRAVENOUS at 11:58

## 2025-06-27 RX ADMIN — HYDROMORPHONE HYDROCHLORIDE 0.5 MG: 1 INJECTION, SOLUTION INTRAMUSCULAR; INTRAVENOUS; SUBCUTANEOUS at 15:44

## 2025-06-27 RX ADMIN — HYDROMORPHONE HYDROCHLORIDE 0.5 MG: 1 INJECTION, SOLUTION INTRAMUSCULAR; INTRAVENOUS; SUBCUTANEOUS at 15:58

## 2025-06-27 RX ADMIN — SODIUM CHLORIDE, POTASSIUM CHLORIDE, SODIUM LACTATE AND CALCIUM CHLORIDE 9 ML/HR: 600; 310; 30; 20 INJECTION, SOLUTION INTRAVENOUS at 09:26

## 2025-06-27 RX ADMIN — ONDANSETRON 4 MG: 2 INJECTION INTRAMUSCULAR; INTRAVENOUS at 09:26

## 2025-06-27 RX ADMIN — FENTANYL CITRATE 25 MCG: 50 INJECTION INTRAMUSCULAR; INTRAVENOUS at 13:05

## 2025-06-27 RX ADMIN — ACETAMINOPHEN 1000 MG: 500 TABLET, FILM COATED ORAL at 09:29

## 2025-06-27 RX ADMIN — HYDROCODONE BITARTRATE AND ACETAMINOPHEN 1 TABLET: 7.5; 325 TABLET ORAL at 14:16

## 2025-06-27 RX ADMIN — PREGABALIN 150 MG: 75 CAPSULE ORAL at 09:29

## 2025-06-27 RX ADMIN — FENTANYL CITRATE 25 MCG: 50 INJECTION INTRAMUSCULAR; INTRAVENOUS at 13:25

## 2025-06-27 NOTE — ANESTHESIA PROCEDURE NOTES
Peripheral Block    Pre-sedation assessment completed: 6/27/2025 9:34 AM    Patient reassessed immediately prior to procedure    Patient location during procedure: pre-op  Start time: 6/27/2025 9:43 AM  Stop time: 6/27/2025 9:44 AM  Reason for block: at surgeon's request, post-op pain management and secondary anesthetic  Performed by  ANUPAMA/CAA: Anel Green CRNA  Preanesthetic Checklist  Completed: patient identified, IV checked, site marked, risks and benefits discussed, surgical consent, monitors and equipment checked, pre-op evaluation and timeout performed  Prep:  Pt Position: supine  Sterile barriers:cap, gloves, mask and washed/disinfected hands  Prep: ChloraPrep  Patient monitoring: blood pressure monitoring, continuous pulse oximetry and EKG  Procedure    Sedation: yes  Performed under: local infiltration  Guidance:ultrasound guided  Images:still images obtained, printed/placed on chart    Laterality:right  Block Type:iPack  Injection Technique:single-shot  Needle Type:echogenic  Needle Gauge:21 G  Resistance on Injection: none    Medications Used: dexmedetomidine HCl (PRECEDEX) injection - Perineural   20 mcg - 6/27/2025 9:44:00 AM  lidocaine PF (XYLOCAINE) injection 1% - Infiltration   2 mg - 6/27/2025 9:43:00 AM  bupivacaine PF (MARCAINE) injection 0.25% - Peripheral Nerve   20 mL - 6/27/2025 9:44:00 AM      Post Assessment  Injection Assessment: negative aspiration for heme, no paresthesia on injection and incremental injection  Patient Tolerance:comfortable throughout block  Complications:no  Performed by: Anel Green CRNA

## 2025-06-27 NOTE — OP NOTE
DATE OF OPERATION: 6/27/2025    PREOPERATIVE DIAGNOSIS:   1. Right knee recurrent patellar instability  2.  Right knee intra-articular loose body    POSTOPERATIVE DIAGNOSES:    1. Right knee recurrent patella instability  2.  Right knee intra-articular loose body       PROCEDURES PERFORMED:    1. Right knee medial patellofemoral ligament reconstruction with allograft for patella instability-proximal realignment  2.  Right knee arthroscopy with loose body removal  3.  Right knee arthroscopic lateral release    SURGEON: Stevenson Jaimes MD    ASSISTANT: Assistant: Rohit Hogan CSA was responsible for performing the following activities: Retraction, Irrigation, Closing, and Placing Dressing and their skilled assistance was necessary for the success of this case.    ANESTHESIA: MAC    ESTIMATED BLOOD LOSS: 50 mL    URINE OUTPUT: Not recorded.     FLUIDS: Per Anesthesia.     COMPLICATIONS: None.     SPECIMENS: None.     DRAINS: None.     Implants: [employer] 1.8 mm Q fix anchor x 2, semitendinosis allograft x 1, 7 x 20 mm biointerference screw x 1    Tourniquet Times:     Inflated: 6/27/2025 12:19 PM     Deflated: 6/27/2025  1:44 PM    EXAMINATION UNDER ANESTHESIA: Passive range of motion of the right knee 0° to 125°, mild effusion noted, grade 1A Lachman, negative anterior and posterior drawer, stable to varus and valgus stress 0° and 30°, midline patellar tracking, 3 and half quadrant lateral patellar laxity with increased lateral tilt, 1 quadrant medial patellar laxity    ARTHROSCOPY FINDINGS:    1. Suprapatellar pouch- free of loose bodies.    2. Medial and lateral gutters-large loose body in the superior lateral recess  3. Patellofemoral joint: Grade 3 chondral wear medial patella facet with increased lateral translation and lateral tilt  4. Medial compartment: No evidence of meniscal or articular cartilage pathology meniscal root intact.    5. Notch: ACL intact. PCL intact.    6. Lateral compartment:  No  evidence of meniscal or articular cartilage pathology.     INDICATIONS FOR PROCEDURE: The patient is a pleasant 29 y.o. male with significant history of pain in right knee with catching episodes as well as recurrent patella instability. Given persistence of pain, recurrence of patellar instability, failure of conservative treatment, as well as MRI findings consistent with the above-mentioned diagnosis the patient elected to proceed with the above-mentioned procedure. Patient had also failed conservative treatment. Patient was explained details of the procedure, as well as risks, benefits, and alternatives as documented on the history and physical, and had all questions answered prior to signing the operative consent form. No guarantees were given in regard to the results of the surgery.     DESCRIPTION OF PROCEDURE: The patient was seen, evaluated, and cleared for surgery by Anesthesia. Patient was met in the preoperative holding area. The operative site was marked, consent was reviewed, history and physical was updated, and preoperative labs were reviewed. The patient was then taken to the operating room and placed in a supine position on a regular OR table. After successful intubation per Anesthesia, an examination under anesthesia was carried out at this point in time. A nonsterile tourniquet was applied to the right lower extremity. The right leg was placed in a leg manzo, and the contralateral leg placed in stirrups. The patient was secured to table with a waist strap. All bony prominences were well padded. The right lower extremity was then sterilely prepped and draped in a standard fashion.     A formal timeout was completed, including confirmation of history and physical, operative consent, surgical site, patient identification number, and preoperative antibiotic administration. The right leg was then exsanguinated and the tourniquet inflated to 250 mmHg. The procedure was begun with establishing a standard  anterolateral portal with a #11 blade followed by insertion of a blunt trocar and cannula. The scope was then inserted at this point in time. Anteromedial portal was then created with spinal needle using outside-in technique. A probe was then used to complete a diagnostic arthroscopic exam with findings as noted above.     Attention was then turned to debridement of the medial patella facet with ablation wand on the setting debriding the chondral wear to a smooth stable edge with no evidence of residual delaminating tissue at this point in time.    Attention was then turned to removal of large loose body from the superior lateral recess.  Accessory superior lateral portal was made at this point in time with a spinal needle followed by 15 blade.  Hemostat was used to remove the large loose body and pictures were taken on the back table.  No evidence of additional loose bodies were noted on secondary exam of the knee joint at this time.    Ablation wand was then used to complete a soft tissue lateral release in longitudinal fashion just lateral to the margin of the patella extending from the vastus lateralis down to the lateral portal at this time.  This improved significantly the centralization of the patella on exam with the scope.    Attention was then turned to the medial patellofemoral ligament reconstruction.  Mini C-arm fluoroscopy was used to identify the entry site for the femoral tunnel on lateral x-ray, just distal and posterior to the posterior cortical line but above Blumenstats line on lateral image.  A small 2 cm incision was made through skin only at this site, blunt dissection down to the level of the medial femur, and guidepin was then placed in this site and aimed in a proximal and anterior trajectory.  It exited through the lateral cortex and lateral soft tissues.  A 7 mm reamer was then passed over the guidepin in unicortical fashion creating a femoral socket at this time.  Suture strands were  then passed with a guidepin out the lateral soft tissues and loop was maintained medially.  This loop was then tunneled through the second layer of medial soft tissue to the anterior incision where the medial patella had been exposed.  Rongeur and rasp were used to create a bleeding bony bed site for attachment of the MPFL ligament at the 1:00 and 3:00 positions. Qfix anchors were placed ×2 at the sites with good secure fixation noted.  A semitendinosus allograft had been opened and was successfully thawed on the back table, it was thoroughly cleansed with sterile saline and then brought to the operative site.  Sutures from the anchors were then passed in running locking fashion while the second suture from each set was used to tie down the end of the graft to the medial patella.  Sutures were cut short at this time.  The graft had been looped around the passing suture from the femoral tunnel and this was used to pull the looped end of the graft through the medial soft tissue tunnel and into the femoral socket the previously had been created.  Tension was applied, knee was ranged from 0-140° with no excessive tension on the patellofemoral joint noted and significant improvement and lateral patellar laxity as well as more centralized patellar position.  Knee was brought to 30° of flexion and a flexible guide pin was inserted into the femoral tunnel followed by a 7 x 20 mm biointerference screw while tension was held on the patella to prevent over tensioning of the graft.  Once this was completed, knee was once again ranged from 0-140° with no undue tension of the patellofemoral compartment.  Flexible guide pin was removed, C-arm fluoroscopy was used to obtain 1 final image confirming appropriate depth of insertion for the screw.    The knee was then thoroughly irrigated with normal saline using a metal cannula and fluid from the scope. The fluid was then evacuated from the knee with suction at this point in time, as  well as direct pressure. All arthroscopic instruments were removed at this point in time. The wounds were then closed with 2-0 nylon in interrupted fashion. Injection of 8 mL of 1% lidocaine plain and 2 mg of morphine were then injected into the knee at this point. The wounds were then dressed with Xeroform gauze, 4 x 4's, Tegaderm, ABD pad, Webril, Ace wrap, ice pack.   At the end of the procedure all lap, needle, and sponge counts were correct x2. The patient had brisk capillary refill to all digits of the right lower extremity. Compartments were soft and easily compressible at the end of the procedure.     DISPOSITION: The patient was extubated per Anesthesia and taken to the recovery room in stable condition. Will be discharged home in the care of family. Follow up in 1 week for a wound check.  Touchdown weightbearing to the right lower extremity. Discharge with Percocet for pain control and Zofran for nausea. Discussed results of the procedure immediately postoperatively with the patient's family, and they had all questions answered at that time.   Aspirin for DVT prophylaxis    Rehab: [facility] protocol

## 2025-06-27 NOTE — H&P
Orthopedic H&P    Subjective:     Patient ID: Dagoberto Diana is a 29 y.o. male.    Chief Complaint:  Right knee pain, patellofemoral instability  History of Present Illness  History of Present Illness  The patient presents for surgical treatment of recurrent right knee patella instability.    He experienced his initial episode of patella dislocation approximately 13 years ago, with a total of 5 to 6 subsequent dislocations. His primary concerns currently include a catching and locking sensation in the anterior aspect of his knee, accompanied by feelings of instability. His most recent episode occurred 3 to 4 years ago. He also reports pain in the anterior aspect of his knee, particularly during activities involving deep flexion, sitting, climbing stairs, and rising from a seated position. Associated stiffness is noted. He quantifies his pain as ranging from 4 to 7 on a scale of 10, describing it as primarily aching and dull. He reports moderate improvement in stability with the use of a J brace. He reports no hip or groin pain, fevers, chills, or sweats. Despite engaging in home exercises for over 12 weeks and taking anti-inflammatory medications, including meloxicam, he reports minimal improvement.     MEDICATIONS  Current: meloxicam    No current facility-administered medications on file prior to encounter.     Current Outpatient Medications on File Prior to Encounter   Medication Sig Dispense Refill    omeprazole (priLOSEC) 20 MG capsule Take 1 capsule by mouth Daily.      meloxicam (MOBIC) 15 MG tablet Take 1 tablet by mouth Daily. (Patient not taking: Reported on 6/26/2025) 30 tablet 5     No Known Allergies       Social History     Occupational History    Not on file   Tobacco Use    Smoking status: Never     Passive exposure: Yes    Smokeless tobacco: Never    Tobacco comments:     Very rarely smoke a single cigarette. Social/Passive use only   Vaping Use    Vaping status: Never Used   Substance and Sexual  "Activity    Alcohol use: Yes     Alcohol/week: 2.0 standard drinks of alcohol     Types: 2 Shots of liquor per week     Comment: \"socially\"     Drug use: Not Currently     Types: Marijuana     Comment: LAST USE APRIL 2025    Sexual activity: Yes     Partners: Female     Birth control/protection: Depo-provera      Past Medical History:   Diagnosis Date    Ankle sprain     Handful of times over the years    Asthma     CHILDHOOD    CHI (closed head injury)     SEVERAL DURING CHILDHOOD   nO loc    Fracture of right foot     MILD HAIR LINE FX   WORE BOOT    Fracture, finger May 13th 2024    GERD (gastroesophageal reflux disease)     Heartburn     for gerd WORKUP    Knee swelling     First noticed the aching/stiff sensation in 2017    Seasonal depression      Past Surgical History:   Procedure Laterality Date    APPENDECTOMY      DENTAL PROCEDURE      ENDOSCOPY N/A 02/05/2025    Procedure: ESOPHAGOGASTRODUODENOSCOPY WITH BIOPSY;  Surgeon: Santiago Breaux MD;  Location: [facility];  Service: Gastroenterology;  Laterality: N/A;  gastric bx to r/o h pylori    HERNIA REPAIR      infant       Family History   Problem Relation Age of Onset    Crohn's disease Mother     Crohn's disease Maternal Grandmother     Cancer Paternal Grandfather         Lung Cancer    Malig Hyperthermia Neg Hx          Review of Systems        Objective:  Vitals:    06/27/25 0909   BP: 149/92   Pulse: 81   Resp: 15   Temp: 98.3 °F (36.8 °C)   TempSrc: Oral   SpO2: 97%   Weight: 95.5 kg (210 lb 9.6 oz)           06/27/25  0909   Weight: 95.5 kg (210 lb 9.6 oz)       Body mass index is 36.13 kg/m².  Physical Exam    Vital signs reviewed.   General: No acute distress, alert and oriented  Eyes: conjunctiva clear; pupils equally round and reactive  ENT: external ears and nose atraumatic; oropharynx clear  CV: no peripheral edema  Resp: normal respiratory effort  Skin: no rashes or wounds; normal turgor  Psych: mood and affect appropriate; recent and remote " memory intact  Debilities: None        Physical Exam  The right knee has an active range of motion from 0 to 130 degrees, with 4+ out of 5 strength on flexion and extension. A palpable loose body is present in the suprapatellar pouch extending down into the lateral recess. Positive compression test and positive patellar apprehension test are noted. There is 3.5 quadrant lateral patella laxity and 1 quadrant medial patellar laxity. Moderate effusion is observed. No medial or lateral joint line pain is reported. Negative Iglesia exam. Grade 1A Lachman, negative anterior and posterior drawer. No hip pain on log roll. Sensation exam is negative. Straight leg raise on the right lower extremity is negative. Brisk capillary refill in all digits, 2+ dorsalis pedis pulse in the right foot.         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 This anatomic variant likely predisposes to patellofemoral instability. 2.There are subtle chronic changes involving the medial patellofemoral ligament indicating scarring secondary to partial injury from prior lateral patellofemoral dislocation. There are also subtle changes along the medial inferior margin of the patella suggesting the sequelae of prior osteochondral impaction injury. 3.Mild to moderate tricompartmental osteoarthritis which is advanced for the patient's age. There is also evidence for multifocal intra-articular loose body formation. Chronic synovial changes are also seen. The findings may be related to multiple prior patellofemoral dislocations with development of loose bodies secondary to displaced osteochondral fragments and subsequent development of early arthropathy. Changes secondary to synovial osteochondromatosis with superimposed early developing osteoarthritis could have this appearance as well. Changes secondary to PVNS are felt less likely. 4.Irregularity along the  inner margin of the anterior horn of the lateral meniscus suggesting the presence of a meniscal flounce. An irregular meniscal tear is felt less likely. Electronically Signed: Serafin Ward MD  2/5/2025 3:54 PM EST  Workstation ID: [id number]      Independent visualization of outside x-rays right knee as well as MRI right knee including independent interpretation indicates evidence of patella instability with large loose body in the suprapatellar pouch and lateral recess, moderate chondral wear of the medial patellar ridge consistent with recurrent patella instability episodes as well as significant laxity of MPFL and increased lateral tilt.  Significant trochlear dysplasia appreciated, TT-TG on my review of approximately 11.    Assessment:        1. Patellar instability of right knee    2. Bodies, loose, joint, knee, right    3. Post-traumatic osteoarthritis of right knee           Plan:          Assessment & Plan  1. Recurrent instability of the right knee patella.  He reports a history of dislocating his patella 5-6 times over the past 13 years, with the most recent episode occurring 3 to 4 years ago. He experiences catching and locking sensations, particularly with deep flexion activities, sitting, climbing stairs, and getting up from a seated position. He also notes associated stiffness and rates his pain as 4-7 out of 10, aching and dull in nature. Physical examination reveals a palpable loose body in the suprapatellar pouch extending into the lateral recess, positive compression test, positive patellar apprehension test, moderate effusion, and 3.5 quadrant lateral patella laxity. He has minimal improvement with home exercises and anti-inflammatory medications, including meloxicam.     Treatment options discussed include observation, intra-articular injection, continued patella bracing, physical therapy, and arthroscopy with loose body removal, lateral release, and MPFL reconstruction. Given his continued  apprehension and pain with locking episodes, he opted for surgical treatment.     The plan is to proceed with right knee arthroscopy with loose body removal, lateral release, and MPFL reconstruction.    Plan will be for right knee arthroscopy, medial patellofemoral ligament reconstruction with allograft, lateral retinacular release, loose body removal and all associated procedures.  I explained to patient details of the procedure as well as risks, benefits, and alternatives the procedure, with risks including but not limited to neurovascular damage, bleeding, infection, chronic pain, re-tear of the ligament repair, disease transmission,  patellofemoral compression, medial instability, recurrent lateral instability, loss of motion, weakness, stiffness, swelling, DVT, pulmonary embolus, death, stroke, complex regional pain syndrome, myocardial infarction, need for additional procedures.  Patient understood all these and had all questions answered prior to consenting to proceed with surgery.  No guarantees were given in regards to results of procedure.    He has no history of deep vein thrombosis (DVT) or pulmonary embolus, cardiac history, and is not diabetic.      Dagoberto Diana was in agreement with plan and had all questions answered.

## 2025-06-27 NOTE — ANESTHESIA PROCEDURE NOTES
Airway  Reason: elective    Date/Time: 6/27/2025 11:57 AM  Airway not difficult    General Information and Staff    Patient location during procedure: OR  CRNA/CAA: Vladimir Cuevas CRNA    Indications and Patient Condition  Indications for airway management: airway protection    Preoxygenated: yes    Mask difficulty assessment: 0 - not attempted    Final Airway Details    Final airway type: supraglottic airway      Successful airway: unique  Size: 4   Number of attempts at approach: 1  Assessment: lips, teeth, and gum same as pre-op

## 2025-06-27 NOTE — ANESTHESIA PROCEDURE NOTES
Peripheral Block    Pre-sedation assessment completed: 6/27/2025 9:34 AM    Patient reassessed immediately prior to procedure    Patient location during procedure: pre-op  Start time: 6/27/2025 9:40 AM  Stop time: 6/27/2025 9:42 AM  Reason for block: at surgeon's request, post-op pain management and secondary anesthetic  Performed by  ANUPAMA/CAA: Anel Green CRNA  Preanesthetic Checklist  Completed: patient identified, IV checked, site marked, risks and benefits discussed, surgical consent, monitors and equipment checked, pre-op evaluation and timeout performed  Prep:  Pt Position: supine  Sterile barriers:cap, gloves, mask and washed/disinfected hands  Prep: ChloraPrep  Patient monitoring: blood pressure monitoring, continuous pulse oximetry and EKG  Procedure    Sedation: yes  Performed under: local infiltration  Guidance:ultrasound guided and landmark technique  Images:still images obtained, printed/placed on chart    Laterality:right  Block Type:adductor canal block  Injection Technique:single-shot  Needle Type:echogenic  Needle Gauge:21 G  Resistance on Injection: none    Medications Used: dexmedetomidine HCl (PRECEDEX) injection - Perineural   30 mcg - 6/27/2025 9:42:00 AM  lidocaine PF (XYLOCAINE) injection 1% - Infiltration   2 mg - 6/27/2025 9:40:00 AM  bupivacaine PF (MARCAINE) injection 0.25% - Peripheral Nerve   10 mL - 6/27/2025 9:42:00 AM      Post Assessment  Injection Assessment: negative aspiration for heme, no paresthesia on injection and incremental injection  Patient Tolerance:comfortable throughout block  Complications:no  Performed by: Anel Green CRNA

## 2025-06-27 NOTE — BRIEF OP NOTE
KNEE ARTHROSCOPY WITH PATELLA FEMORAL LIGAMENT RECONSTRUCTION  Progress Note    Dagoberto Diana  6/27/2025    Pre-op Diagnosis:   Patellar instability of right knee [M25.361]  Bodies, loose, joint, knee, right [M23.41]       Post-Op Diagnosis Codes:     * Patellar instability of right knee [M25.361]     * Bodies, loose, joint, knee, right [M23.41]    Procedure(s):      Procedure(s):  Knee arthroscopy, medial patellofemoral ligament reconstruction, lateral release, loose body removal            Surgeon(s):  Stevenson Jaimes MD    Anesthesia: General with Block    Staff:   Circulator: Rema Neal RN; Sesar Smith RN  Scrub Person: Octavia Gale  Vendor Representative: Rajeev Sharma; Saeid Sorenson  Assistant: Rohit Hogan CSA  Assistant: Rohit Hogan CSA    Estimated Blood Loss: minimal    Urine Voided: * No values recorded between 6/27/2025 11:49 AM and 6/27/2025  1:24 PM *    Specimens:                None      Drains: * No LDAs found *    Findings: loose body, lateral patella tracking      Complications: none    Assistant: Rohit Hogan CSA  was responsible for performing the following activities: Retraction, Irrigation, Closing, and Placing Dressing and their skilled assistance was necessary for the success of this case.    Stevenson Jaimes MD     Date: 6/27/2025  Time: 13:39 EDT

## 2025-06-27 NOTE — ANESTHESIA POSTPROCEDURE EVALUATION
Patient: Dagoberto Diana    Procedure Summary       Date: 06/27/25 Room / Location: [facility] OR 2 / [facility]    Anesthesia Start: 1149 Anesthesia Stop: 1348    Procedure: Knee arthroscopy, medial patellofemoral ligament reconstruction, possible lateral release, loose removal, meniscal and cartilage surgery as indicated (Right: Knee) Diagnosis:       Patellar instability of right knee      Bodies, loose, joint, knee, right      (Patellar instability of right knee [M25.361])      (Bodies, loose, joint, knee, right [M23.41])    Surgeons: Stevenson Jaimes MD Provider: Vladimir Cuevas CRNA    Anesthesia Type: MAC ASA Status: 2            Anesthesia Type: MAC    Vitals  Vitals Value Taken Time   /72 06/27/25 14:20   Temp     Pulse 103 06/27/25 14:24   Resp 14 06/27/25 14:25   SpO2 92 % 06/27/25 14:24   Vitals shown include unfiled device data.        Post Anesthesia Care and Evaluation    Patient location during evaluation: PHASE II  Patient participation: complete - patient participated  Level of consciousness: awake  Pain management: adequate    Airway patency: patent  Anesthetic complications: No anesthetic complications  PONV Status: none  Cardiovascular status: acceptable  Respiratory status: acceptable  Hydration status: acceptable

## 2025-06-27 NOTE — ANESTHESIA PREPROCEDURE EVALUATION
Anesthesia Evaluation     Patient summary reviewed and Nursing notes reviewed   no history of anesthetic complications:   NPO Solid Status: > 8 hours  NPO Liquid Status: > 8 hours           Airway   Mallampati: II  TM distance: >3 FB  Neck ROM: full  No difficulty expected  Dental      Pulmonary     breath sounds clear to auscultation  (+) a smoker Former, vape, asthma (childhood),  Cardiovascular   Exercise tolerance: good (4-7 METS)    ECG reviewed  Rhythm: regular  Rate: normal      ROS comment: HEART RATE= 90  bpm  RR Interval= 668  ms  MN Interval= 151  ms  P Horizontal Axis= 4  deg  P Front Axis= 43  deg  QRSD Interval= 97  ms  QT Interval= 325  ms  QRS Axis= 11  deg  T Wave Axis= 22  deg  - OTHERWISE NORMAL ECG -  Sinus arrhythmia  NO PRIOR TRACING AVAILABLE FOR COMPARISON  Electronically Signed By: Chi Bocanegra ([facility]) 17-Jun-2022 20:10:04  Date and Time of Study: 2022-06-16 20:25:50      Neuro/Psych  (+) psychiatric history (seasonal) Depression  GI/Hepatic/Renal/Endo    (+) obesity, GERD well controlled    Musculoskeletal     Abdominal   (+) obese   Substance History   (+) alcohol use (monthly 1-2 drinks), drug use (MJ gummies every once in a while)     OB/GYN          Other   arthritis,                   Anesthesia Plan    ASA 2     MAC and general with block     intravenous induction     Anesthetic plan, risks, benefits, and alternatives have been provided, discussed and informed consent has been obtained with: patient.    Use of blood products discussed with patient  Consented to blood products.    Plan discussed with CRNA.    CODE STATUS:

## 2025-06-30 ENCOUNTER — HOSPITAL ENCOUNTER (OUTPATIENT)
Dept: PHYSICAL THERAPY | Facility: HOSPITAL | Age: 30
Setting detail: THERAPIES SERIES
Discharge: HOME | End: 2025-06-30
Payer: COMMERCIAL

## 2025-06-30 DIAGNOSIS — M23.41 BODIES, LOOSE, JOINT, KNEE, RIGHT: ICD-10-CM

## 2025-06-30 DIAGNOSIS — M25.361 PATELLAR INSTABILITY OF RIGHT KNEE: Primary | ICD-10-CM

## 2025-06-30 PROCEDURE — 97161 PT EVAL LOW COMPLEX 20 MIN: CPT

## 2025-06-30 PROCEDURE — 97110 THERAPEUTIC EXERCISES: CPT

## 2025-06-30 NOTE — THERAPY EVALUATION
Outpatient Physical Therapy Ortho Initial Evaluation   Nikita     Patient Name: Dagoberto Diana  : 1995  MRN: [id number]  Today's Date: 2025        Visit Date: 2025    Patient Active Problem List   Diagnosis    Heartburn    Patellar instability of right knee    Bodies, loose, joint, knee, right    Gastritis without bleeding    Chronic idiopathic constipation        Past Medical History:   Diagnosis Date    Ankle sprain     Handful of times over the years    Asthma     CHILDHOOD    CHI (closed head injury)     SEVERAL DURING CHILDHOOD   nO loc    Fracture of right foot     MILD HAIR LINE FX   WORE BOOT    Fracture, finger May 13th 2024    GERD (gastroesophageal reflux disease)     Heartburn     for gerd WORKUP    Knee swelling     First noticed the aching/stiff sensation in 2017    Seasonal depression         Past Surgical History:   Procedure Laterality Date    APPENDECTOMY      DENTAL PROCEDURE      ENDOSCOPY N/A 2025    Procedure: ESOPHAGOGASTRODUODENOSCOPY WITH BIOPSY;  Surgeon: Santiago Breaux MD;  Location: [facility];  Service: Gastroenterology;  Laterality: N/A;  gastric bx to r/o h pylori    HERNIA REPAIR      infant    KNEE ARTHROSCOPY WITH PATELLA FEMORAL LIGAMENT RECONSTRUCTION Right 2025    Procedure: Knee arthroscopy, medial patellofemoral ligament reconstruction, possible lateral release, loose removal, meniscal and cartilage surgery as indicated;  Surgeon: Stevenson Jaimes MD;  Location: [facility];  Service: Orthopedics;  Laterality: Right;       Visit Dx:   No diagnosis found.       Patient History       Row Name 25 1000             History    Chief Complaint Difficulty Walking;Difficulty with daily activities;Joint stiffness;Joint swelling;Muscle tenderness;Muscle weakness;Pain;Tightness  -LN      Type of Pain Knee pain  Right knee  -LN      Date Current Problem(s) Began 25  -LN      Brief Description of Current Complaint Pt s/p Right knee medial  "patellofemoral ligament reconstruction with allograft for patella instability-proximal realignment; Right knee arthroscopy with loose body removal,  & Right knee arthroscopic lateral release on 6/27/25 and referred for outpatient PT per MD MFPL protocol.  Pt reports hx of R patella dislocations; began when he was playing football in 7th or 8th grade & then has had 5-6 total dislocations with last one being in in [id number]. Pt reports he has also developed some mild arthritis behind his patella and that has been giving him some pain recently.  -LN      Previous treatment for THIS PROBLEM Surgery;Medication  -LN      Surgery Date: 06/27/25  -LN      Patient/Caregiver Goals Relieve pain;Improve mobility;Improve strength;Know what to do to help the symptoms;Decrease swelling  -LN      Patient/Caregiver Goals Comment \"healing after operation; regain full movement of knee.\"  -LN      Occupation/sports/leisure activities /- [employer]- off work presently until MD releases him for light duty. He likes aicha and watching shows.  -LN      Patient seeing anyone else for problem(s)? Ortho- Dr. Jaimes  -LN      How has patient tried to help current problem? CP & elevation; ASA; pain meds as needed; using walker or crutches ; wearing knee brace locked in extension  -LN      What clinical tests have you had for this problem? 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 This anatomic variant likely predisposes to patellofemoral instability.  2.There are subtle chronic changes involving the medial patellofemoral ligament indicating scarring secondary to partial injury from prior lateral patellofemoral dislocation. There are also subtle changes along the medial inferior margin of the patella   suggesting the sequelae of prior osteochondral impaction injury.  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 There is also evidence for multifocal intra-articular loose body formation. Chronic synovial changes are also seen. The findings may be related to multiple prior   patellofemoral dislocations with development of loose bodies secondary to displaced osteochondral fragments and subsequent development of early arthropathy. Changes secondary to synovial osteochondromatosis with superimposed early developing   osteoarthritis could have this appearance as well. Changes secondary to PVNS are felt less likely.  4.Irregularity along the inner margin of the anterior horn of the lateral meniscus suggesting the presence of a meniscal flounce. An irregular meniscal tear is felt less likely.  -LN      Related/Recent Hospitalizations No  surgery was done as an outpatient  -LN      History of Previous Related Injuries yes- hx of multiple R patella dislocations  -LN         Pain     Pain Location Knee  Right  -LN      Pain at Present 3  -LN      Pain at Best 0  at rest  -LN      Pain at Worst 7;8  with rolling over to get out of bed  -LN      Pain Frequency Intermittent  -LN      Pain Description Discomfort;Throbbing;Aching  -LN      What Performance Factors Make the Current Problem(s) WORSE? rolling to get out of bed; riding in car; moving right leg  -LN      What Performance Factors Make the Current Problem(s) BETTER? rest; CP; elevation; pain meds  -LN      Tolerance Time- Standing limited  -LN      Tolerance Time- Sitting limited deborah with having right leg down  -LN      Tolerance Time- Walking limited; pt is presently TDWB/NWB R leg with brace locked in 0 degreees extension.  -LN      Tolerance Time- Lying difficulty getting in a comfortable position  -LN      Is your sleep disturbed? Yes  deborah with getting used to wearing brace  -LN      What position do you sleep in? Right sidelying  presently sleeping on the couch; normally sleeps on his right side  -LN      Difficulties at work? off work presently  -LN       Difficulties with ADL's? yes needs help with R shoe and sock R foot & lower body dressing  -LN      Difficulties with recreational activities? yes  -LN         Fall Risk Assessment    Any falls in the past year: No  -LN         Services    Prior Rehab/Home Health Experiences No  -LN      Are you currently receiving Home Health services No  -LN      Do you plan to receive Home Health services in the near future No  -LN         Daily Activities    Primary Language English  -LN      Are you able to read Yes  -LN      Are you able to write Yes  -LN      How does patient learn best? Listening;Demonstration  -LN      Teaching needs identified Home Exercise Program;Management of Condition;Other (comment)  Risks and benefits of treatment explained to pt.  -LN      Patient is concerned about/has problems with Bed Mobility;Climbing Stairs;Difficulty with self care (i.e. bathing, dressing, toileting:;Flexibility;Grasping objects lifting;Performing home management (household chores, shopping, care of dependents);Performing job responsibilities/community activities (work, school,;Performing sports, recreation, and play activities;Sitting;Standing;Transfers (getting out of a chair, bed);Walking  -LN      Does patient have problems with the following? None  -LN      Barriers to learning None  -LN      Pt Participated in POC and Goals Yes  -LN         Safety    Are you being hurt, hit, or frightened by anyone at home or in your life? No  -LN      Are you being neglected by a caregiver No  -LN      Have you had any of the following issues with N/A  -LN                User Key  (r) = Recorded By, (t) = Taken By, (c) = Cosigned By      Initials Name Provider Type    Jennifer Knapp, PT Physical Therapist                     PT Ortho       Row Name 06/30/25 1000       Precautions and Contraindications    Precautions per MD protocol  -LN       Subjective Pain    Able to rate subjective pain? yes  -LN    Pre-Treatment Pain  Level 3  -LN       Posture/Observations    Observations Edema;Incision healing;Muscle atrophy  -LN    Posture/Observations Comments Removed ace wrap and outer bandage; but pt does have several 4 x4's over incisions and kept in place with clear adhesive bandage (this was left on). Moderate edema noted R knee.  Pt with brace on locked in full extension.  -LN       Knee Palpation    Patella Right:;Tender;Swollen  -LN       Patellar Accessory Motions    Superior glide Right:;Hypomobile  mild limitation  -LN    Inferior glide Right:;Hypomobile  mild limitation  -LN    Medial glide Right:;WNL  -LN    Lateral glide --  NT per MD protocol  -LN       Knee Special Tests    Alina’s sign (DVT) Right:;Negative  -LN    Knee Special Tests Comments No special tests done to R knee due to recent surgery.  -LN       Right Lower Ext    Rt Knee Extension/Flexion AROM 0-30 degrees (he is limited to this at this time due to MD protocol).  -LN    Rt Ankle Dorsiflexion AROM WNL  -LN    Rt Ankle Dorsiflexion PROM WNL  -LN       MMT (Manual Muscle Testing)    General MMT Comments No MMT done at this time to R LE. Fair QS noted.  -LN       Sensation    Sensation WNL? WNL  -LN    Light Touch No apparent deficits  -LN    Additional Comments no c/o any N/T R knee/leg.  -LN       Lower Extremity Flexibility    Hamstrings Right:;Mildly limited;Moderately limited  -LN    Gastrocnemius Right:;Mildly limited  -LN    Soleus Right:;Mildly limited  -LN       RLE Quick Girth (cm)    Smallest ankle 26.6 cm  -LN    Largest calf 40 cm  -LN    Tib tuberosity 36.6 cm  -LN    Mid patella 43.5 cm  -LN    Distal thigh 51.7 cm  -LN    RLE Quick Girth Total 198.4  -LN       LLE Quick Girth (cm)    Smallest ankle 24.4 cm  -LN    Largest calf 39.5 cm  -LN    Tib tuberosity 34.2 cm  -LN    Mid patella 38.9 cm  -LN    Distal thigh 48 cm  -LN       Transfers    Sit-Stand [place] (Transfers) independent  -LN    Stand-Sit [place] (Transfers) independent  -LN     Transfers, Sit-Stand-Sit, Assist Device standard walker  NWB/TDWB R leg  -LN    Comment, (Transfers) Pt did need minimal assistance with R LE with supine to sit to supine transfers.  -LN       Gait/Stairs (Locomotion)    Brimson Level (Gait) independent  -LN    Assistive Device (Gait) walker, standard  -LN    Deviations/Abnormal Patterns (Gait) right sided deviations;antalgic;gait speed decreased  Pt is NWB/TDWB R leg in brace locked in full extension.  -LN    Right Sided Gait Deviations forward flexed posture  -LN    Comment, (Gait/Stairs) Pt reports he also has crutches at home that he sometimes uses; but needs to get some extra padding for them; he reported his arm pits were getting really sore from using them.  -LN              User Key  (r) = Recorded By, (t) = Taken By, (c) = Cosigned By      Initials Name Provider Type    LN Jennifer Sellers, PT Physical Therapist                                Therapy Education  Education Details: Pt to work on HEP 2 x day as tolerated; keep brace on for SLR. Pt to remain NWB/TDWB R leg in brace locked in full extension (with walker or crutches) until MD okays him to be WBAT (at 4-6 weeks post-op). Pt to use ice and elevation after he exercises and as needed. Did verbally instruct pt in correct way to use crutches with most of weight on palms of his hands and not on arm pits and to not lean on crutches.  Given: HEP, Symptoms/condition management, Pain management, Mobility training, Edema management  Program: New  How Provided: Verbal, Demonstration, Written  Provided to: Patient  Level of Understanding: Teach back education performed, Verbalized, Demonstrated      PT OP Goals       Row Name 06/30/25 1000          PT Short Term Goals    STG Date to Achieve 07/14/25  -LN     STG 1 Pt to verbally report decreased R knee pain to <5/10 at worst with ADLs and everyday activities.  -LN     STG 2 Pt independent with initial HEP issued by therapist (and per MPFL  reconstruction protocol)..  -LN     STG 3 R knee ROM improved to 0-90 degrees.  -LN     STG 4 Good QS noted with exercise and pt able to do 10 reps independent SLR with brace locked in full extension.  -LN     STG 5 Edema right knee decreased by 1-2 cm.  -LN     STG 6 Pt able to transfer supine to sit to supine independently with no assistance needed with right leg.  -LN        Long Term Goals    LTG Date to Achieve 07/28/25  -LN     LTG 1 Pt to verbally report decreased R knee pain to <3/10 at worst with ADLs and everyday activities.  -LN     LTG 2 Pt independent with more advanced HEP issued by therapist (and per MPFL reconstruction protocol)..  -LN     LTG 3 R knee ROM improved to 0-120 degrees.  -LN     LTG 4 R knee and hip strength improved to 4-4+/5.  -LN     LTG 5 Edema R knee decreased to nominal.  -LN     LTG 6 Pt able to ambulate with crutches independently, WBAT R leg (as MD allows) with good gait speed and good balance noted.  -LN        Time Calculation    PT Goal Re-Cert Due Date 07/28/25  -LN               User Key  (r) = Recorded By, (t) = Taken By, (c) = Cosigned By      Initials Name Provider Type    LN Jennifer Sellers, PT Physical Therapist                     PT Assessment/Plan       Row Name 06/30/25 1000          PT Assessment    Functional Limitations Impaired gait;Impaired locomotion;Limitation in home management;Limitations in community activities;Limitations in functional capacity and performance;Performance in leisure activities;Performance in self-care ADL;Performance in work activities  -LN     Impairments Edema;Gait;Impaired flexibility;Joint mobility;Locomotion;Muscle strength;Pain;Range of motion  -LN     Assessment Comments Pt presents with hx of 5-6 patellar dislocations R knee; most recently 2-3 years ago and is now 3 days s/p R knee MFPL reconstruction with allograft, knee scope with loose body removal, and lateral release. Pt presents with c/o only mild right knee pain;  worse with rolling to get out of bed and with riding in car; decreased right knee ROM (limited at this time to 0-30 degrees per MD protocol); decreased right hip and knee strength; moderate edema; minimal patellar tenderness; mild decrease in patellar mobility (no lateral glides); decreased flexibility R LE; and gait deficit. Pt with decreased sitting, standing, and walking tolerance. He is now ambulating independently with walker or 2 crutches, NWB/TDWB R leg in brace locked in full extension. Pt with decreased tolerance to home/community/work and leisure activities due to above. Add heel prop and NWB plantarflexion vs TB as tolerated next visit. -LN     Please refer to paper survey for additional self-reported information Yes  -LN     Rehab Potential Good  -LN     Patient/caregiver participated in establishment of treatment plan and goals Yes  -LN     Patient would benefit from skilled therapy intervention Yes  -LN        PT Plan    PT Frequency 2x/week  -LN     Predicted Duration of Therapy Intervention (PT) 10-12 weeks  -LN     Planned CPT's? PT EVAL LOW COMPLEXITY: 77422;PT RE-EVAL: 00089;PT THER PROC EA 15 MIN: 71797;PT MANUAL THERAPY EA 15 MIN: 64493;PT GAIT TRAINING EA 15 MIN: 70598;PT HOT OR COLD PACK TREAT MCARE;PT ELECTRICAL STIM UNATTEND:   -LN     Physical Therapy Interventions (Optional Details) gait training;home exercise program;manual therapy techniques;modalities;patient/family education;ROM (Range of Motion);stair training;strengthening;stretching;taping  -LN     PT Plan Comments See pt 2 x week for therapeutic exercise with HEP per MPFL reconstruction protocol; modalities PRN (CP/MH/IFC); Togolese stim for Quad strengthening/mm re-education; gait training; stairs training as needed; pt education, and kinesiotape PRN.  -LN               User Key  (r) = Recorded By, (t) = Taken By, (c) = Cosigned By      Initials Name Provider Type    Jennifer Knapp, PT Physical Therapist          "            Modalities       Row Name 06/30/25 1000             Ice    Patient reports will apply ice at home to involved area Yes  -LN                User Key  (r) = Recorded By, (t) = Taken By, (c) = Cosigned By      Initials Name Provider Type    Jennifer Knapp, PT Physical Therapist                   OP Exercises       Row Name 06/30/25 1000             Precautions    Existing Precautions/Restrictions other (see comments);brace on at all times;non-weight bearing  Per MD MFPL protocol; brace locked in 0 degrees extension.  -LN         Subjective    Subjective Comments Pt reports his knee hasn't been too painful so far; but hurts more with rolling to get out of bed and riding in car. \"I think the brace has slid down because it doesn't feel quite right but I didn't want to mess with it.\"  -LN         Subjective Pain    Able to rate subjective pain? yes  -LN      Pre-Treatment Pain Level 3  -LN         Total Minutes    15125 - PT Therapeutic Exercise Minutes 20  -LN         Exercise 1    Exercise Name 1 Heel slides  -LN      Reps 1 5 minutes  -LN      Additional Comments limited to 0-30 degrees presently  -LN         Exercise 2    Exercise Name 2 supine gastroc stretch with sheet  -LN      Cueing 2 Verbal;Tactile;Demo  -LN      Reps 2 10  -LN      Time 2 10 sec  -LN         Exercise 3    Exercise Name 3 Russian stim with QS over single towel roll  -LN      Cueing 3 Verbal;Tactile;Demo  -LN      Reps 3 10/10  -LN      Time 3 10 minutes  -LN         Exercise 4    Exercise Name 4 SLR in brace locked in full extension  -LN      Cueing 4 Verbal;Tactile;Demo  -LN      Sets 4 2  -LN      Reps 4 5  -LN      Additional Comments with assist from therapist  -LN                User Key  (r) = Recorded By, (t) = Taken By, (c) = Cosigned By      Initials Name Provider Type    Jennifer Knapp, PT Physical Therapist                                  Outcome Measure Options: Lower Extremity Functional Scale " (LEFS)  Lower Extremity Functional Index  Any of your usual work, housework or school activities: Extreme difficulty or unable to perform activity  Your usual hobbies, recreational or sporting activities: A little bit of difficulty  Getting into or out of the bath: Moderate difficulty  Walking between rooms: No difficulty  Putting on your shoes or socks: Extreme difficulty or unable to perform activity  Squatting: Extreme difficulty or unable to perform activity  Lifting an object, like a bag of groceries from the floor: Moderate difficulty  Performing light activities around your home: A little bit of difficulty  Performing heavy activities around your home: Extreme difficulty or unable to perform activity  Getting into or out of a car: Quite a bit of difficulty  Walking 2 blocks: Quite a bit of difficulty  Walking a mile: Quite a bit of difficulty  Going up or down 10 stairs (about 1 flight of stairs): Extreme difficulty or unable to perform activity  Standing for 1 hour: No difficulty  Sitting for 1 hour: Moderate difficulty  Running on even ground: Extreme difficulty or unable to perform activity  Running on uneven ground: Extreme difficulty or unable to perform activity  Making sharp turns while running fast: Extreme difficulty or unable to perform activity  Hopping: No difficulty  Rolling over in bed: Moderate difficulty  Total: 29  Lower Extremity Functional Index  Any of your usual work, housework or school activities: Extreme difficulty or unable to perform activity  Your usual hobbies, recreational or sporting activities: A little bit of difficulty  Getting into or out of the bath: Moderate difficulty  Walking between rooms: No difficulty  Putting on your shoes or socks: Extreme difficulty or unable to perform activity  Squatting: Extreme difficulty or unable to perform activity  Lifting an object, like a bag of groceries from the floor: Moderate difficulty  Performing light activities around your home: A  little bit of difficulty  Performing heavy activities around your home: Extreme difficulty or unable to perform activity  Getting into or out of a car: Quite a bit of difficulty  Walking 2 blocks: Quite a bit of difficulty  Walking a mile: Quite a bit of difficulty  Going up or down 10 stairs (about 1 flight of stairs): Extreme difficulty or unable to perform activity  Standing for 1 hour: No difficulty  Sitting for 1 hour: Moderate difficulty  Running on even ground: Extreme difficulty or unable to perform activity  Running on uneven ground: Extreme difficulty or unable to perform activity  Making sharp turns while running fast: Extreme difficulty or unable to perform activity  Hopping: No difficulty  Rolling over in bed: Moderate difficulty  Total: 29      Time Calculation:     Start Time: 1005  Stop Time: 1108  Time Calculation (min): 63 min  Timed Charges  24497 - PT Therapeutic Exercise Minutes: 20  Total Minutes  Timed Charges Total Minutes: 20   Total Minutes: 20     Therapy Charges for Today       Code Description Service Date Service Provider Modifiers Qty    13066426637 HC PT THER PROC EA 15 MIN 6/30/2025 Jennifer Sellers, PT GP 1    [id number]  PT EVAL LOW COMPLEXITY 3 6/30/2025 Jennifer Sellers, PT GP 1            PT G-Codes  Outcome Measure Options: Lower Extremity Functional Scale (LEFS)  Total: 29         Jennifer Sellers, PT  6/30/2025

## 2025-07-03 ENCOUNTER — HOSPITAL ENCOUNTER (OUTPATIENT)
Dept: PHYSICAL THERAPY | Facility: HOSPITAL | Age: 30
Setting detail: THERAPIES SERIES
Discharge: HOME | End: 2025-07-03
Payer: COMMERCIAL

## 2025-07-03 ENCOUNTER — OFFICE VISIT (OUTPATIENT)
Dept: ORTHOPEDIC SURGERY | Facility: CLINIC | Age: 30
End: 2025-07-03
Payer: COMMERCIAL

## 2025-07-03 VITALS — BODY MASS INDEX: 35.85 KG/M2 | WEIGHT: 210 LBS | HEIGHT: 64 IN

## 2025-07-03 DIAGNOSIS — Z98.890 STATUS POST ARTHROSCOPIC KNEE SURGERY: Primary | ICD-10-CM

## 2025-07-03 DIAGNOSIS — M25.361 PATELLAR INSTABILITY OF RIGHT KNEE: Primary | ICD-10-CM

## 2025-07-03 DIAGNOSIS — M23.41 BODIES, LOOSE, JOINT, KNEE, RIGHT: ICD-10-CM

## 2025-07-03 PROCEDURE — 97110 THERAPEUTIC EXERCISES: CPT | Performed by: PHYSICAL THERAPIST

## 2025-07-03 RX ORDER — METHYLPREDNISOLONE 4 MG/1
TABLET ORAL
Qty: 21 TABLET | Refills: 0 | Status: SHIPPED | OUTPATIENT
Start: 2025-07-03

## 2025-07-03 NOTE — THERAPY TREATMENT NOTE
"  Outpatient Physical Therapy Ortho Treatment Note   Nikita     Patient Name: Dagoberto Diana  : 1995  MRN: [id number]  Today's Date: 7/3/2025      Visit Date: 2025    Visit Dx:    ICD-10-CM ICD-9-CM   1. Patellar instability of right knee  M25.361 718.86   2. Bodies, loose, joint, knee, right  M23.41 717.6       Patient Active Problem List   Diagnosis    Heartburn    Patellar instability of right knee    Bodies, loose, joint, knee, right    Gastritis without bleeding    Chronic idiopathic constipation    status post right knee medial patellofemoral ligament reconstruction with allograft for patellar instability proximal realignment, arthroscopy with loose body removal, lateral lease, DOS 2025        Past Medical History:   Diagnosis Date    Ankle sprain     Handful of times over the years    Asthma     CHILDHOOD    CHI (closed head injury)     SEVERAL DURING CHILDHOOD   nO loc    Fracture of right foot     MILD HAIR LINE FX   WORE BOOT    Fracture, finger May 13th 2024    GERD (gastroesophageal reflux disease)     Heartburn     for gerd WORKUP    Knee swelling     First noticed the aching/stiff sensation in     Seasonal depression         Past Surgical History:   Procedure Laterality Date    APPENDECTOMY      DENTAL PROCEDURE      ENDOSCOPY N/A 2025    Procedure: ESOPHAGOGASTRODUODENOSCOPY WITH BIOPSY;  Surgeon: Santiago Breaux MD;  Location: [facility];  Service: Gastroenterology;  Laterality: N/A;  gastric bx to r/o h pylori    HERNIA REPAIR      infant    KNEE ARTHROSCOPY WITH PATELLA FEMORAL LIGAMENT RECONSTRUCTION Right 2025    Procedure: Knee arthroscopy, medial patellofemoral ligament reconstruction, possible lateral release, loose removal, meniscal and cartilage surgery as indicated;  Surgeon: Stevenson Jaimes MD;  Location: [facility];  Service: Orthopedics;  Laterality: Right;        PT Ortho       Row Name 25 1100       Subjective    Subjective Comments \" " "I wasn't as sore as I thought I would be\"  -SP       Precautions and Contraindications    Precautions per MD protocol  -SP              User Key  (r) = Recorded By, (t) = Taken By, (c) = Cosigned By      Initials Name Provider Type    Aicha Rai, PT Physical Therapist                                 PT Assessment/Plan       Row Name 07/03/25 1210          PT Assessment    Assessment Comments Patient tolerates progression of exercises well.  No pain with right knee AROM 0-30 degrees  -SP        PT Plan    PT Plan Comments Continue to progress per protocol  -SP               User Key  (r) = Recorded By, (t) = Taken By, (c) = Cosigned By      Initials Name Provider Type    Aicha Rai, PT Physical Therapist                     Modalities       Row Name 07/03/25 1100             Ice    Patient reports will apply ice at home to involved area Yes  -SP                User Key  (r) = Recorded By, (t) = Taken By, (c) = Cosigned By      Initials Name Provider Type    Aicha Rai, PT Physical Therapist                   OP Exercises       Row Name 07/03/25 1211 07/03/25 1100          Precautions    Existing Precautions/Restrictions -- other (see comments);brace on at all times;non-weight bearing  Per MD MFPL protocol; brace locked in 0 degrees extension.  -SP        Subjective    Subjective Comments -- \" I wasn't as sore as I thought I would be\"  -SP        Total Minutes    97421 - PT Therapeutic Exercise Minutes 15  -SP --        Exercise 1    Exercise Name 1 -- Heel slides  -SP     Reps 1 -- 5 minutes  -SP     Additional Comments -- 0-30 degrees per protocol  -SP        Exercise 2    Exercise Name 2 -- supine gastroc stretch with sheet  -SP     Cueing 2 -- Verbal;Tactile;Demo  -SP     Reps 2 -- 10  -SP     Time 2 -- 10 sec  -SP        Exercise 3    Exercise Name 3 -- Russian stim with QS over single towel roll  -SP     Cueing 3 -- Verbal;Tactile;Demo  -SP     Reps 3 -- 10/10  " -SP     Time 3 -- 10 minutes  -SP        Exercise 4    Exercise Name 4 -- SLR in brace locked in full extension  -SP     Cueing 4 -- Verbal;Tactile;Demo  -SP     Sets 4 -- 2  -SP     Reps 4 -- 10  -SP     Additional Comments -- with assist  -SP        Exercise 5    Exercise Name 5 -- hip adduction/ ball squeeze  -SP     Reps 5 -- 10  -SP     Time 5 -- 3 sec  -SP     Additional Comments -- patient supine with LE in extension  -SP        Exercise 6    Exercise Name 6 -- quad set  -SP     Reps 6 -- 10  -SP     Time 6 -- 5 sec  -SP               User Key  (r) = Recorded By, (t) = Taken By, (c) = Cosigned By      Initials Name Provider Type    Aicha Rai, PT Physical Therapist                                     Therapy Education  Given: HEP  Program: Reinforced, Progressed  How Provided: Written, Verbal  Provided to: Patient  Level of Understanding: Verbalized, Demonstrated              Time Calculation:   Start Time: 1050  Stop Time: 1200  Time Calculation (min): 70 min  Timed Charges  68312 - PT Therapeutic Exercise Minutes: 15  Total Minutes  Timed Charges Total Minutes: 15   Total Minutes: 15  Therapy Charges for Today       Code Description Service Date Service Provider Modifiers Qty    23446794606 HC PT THER PROC EA 15 MIN 7/3/2025 Aicha Daniels, PT GP 1                      Aicha Daniels, PT  7/3/2025

## 2025-07-03 NOTE — PROGRESS NOTES
CC: Follow-up status post right knee medial patellofemoral ligament reconstruction with allograft for patellar instability proximal realignment, arthroscopy with loose body removal, arthroscopic lateral lease, DOS 6/27/2025    Interval history: Dagoberto Diana presents to clinic today approximately 1 week post knee arthroscopy.  He has continued with brace locked in extension.  He does report an incident that he is walker got caught on the carpet did heriberto the knee but the brace was present did not have any injury to the knee.  He is exhibiting swelling to the knee.  He has had 1 visit with physical therapy.  He has discontinued [facility] he is continued with ice.  Denies any residual numbness or tingling of the lower extremity.  Denies any concerns present.    Physical examination:  Right knee examined  Moderate to severe swelling  Incision and dressings clean dry and intact positive sensation light touch all distributions right lower extremity  Negative catheters, negative Homans' sign  Dorsiflexion plantarflexion of the ankle  Flex/extend all digits right foot    Assessment: Status post right knee MPFL ligament reconstruction with allograft for patellar instability proximal realignment, loose body removal, arthroscopic lateral release    Plan:  1.  Discussed wound care with patient.  We will proceed with referral for physical therapy progressing motion per protocol  , Utilizing MPFL protocol.  Continue with brace locked in extension.  Mesh dressing removed Steri-Strips were placed can remove the Steri-Strips in 1 week.  Discussed avoid submerging into water for the next 3 weeks can shower allowing the water to run down.  We did discuss unlocking brace for PT only and PT directed activities.  We did readjust brace for better fit.  He will see Dr. Jaimes in 3 weeks with repeat x-ray images of the right knee at follow-up.  All questions answered.

## 2025-07-08 ENCOUNTER — HOSPITAL ENCOUNTER (OUTPATIENT)
Dept: PHYSICAL THERAPY | Facility: HOSPITAL | Age: 30
Setting detail: THERAPIES SERIES
Discharge: HOME | End: 2025-07-08
Payer: COMMERCIAL

## 2025-07-08 ENCOUNTER — TELEPHONE (OUTPATIENT)
Dept: ORTHOPEDIC SURGERY | Facility: CLINIC | Age: 30
End: 2025-07-08

## 2025-07-08 DIAGNOSIS — M25.361 PATELLAR INSTABILITY OF RIGHT KNEE: Primary | ICD-10-CM

## 2025-07-08 DIAGNOSIS — M23.41 BODIES, LOOSE, JOINT, KNEE, RIGHT: ICD-10-CM

## 2025-07-08 NOTE — THERAPY TREATMENT NOTE
Outpatient Physical Therapy Ortho Treatment Note   Nikita     Patient Name: Dagoberto Diana  : 1995  MRN: [id number]  Today's Date: 2025      Visit Date: 2025    Visit Dx:    ICD-10-CM ICD-9-CM   1. Patellar instability of right knee  M25.361 718.86   2. Bodies, loose, joint, knee, right  M23.41 717.6       Patient Active Problem List   Diagnosis    Heartburn    Patellar instability of right knee    Bodies, loose, joint, knee, right    Gastritis without bleeding    Chronic idiopathic constipation    status post right knee medial patellofemoral ligament reconstruction with allograft for patellar instability proximal realignment, arthroscopy with loose body removal, lateral lease, DOS 2025        Past Medical History:   Diagnosis Date    Ankle sprain     Handful of times over the years    Asthma     CHILDHOOD    CHI (closed head injury)     SEVERAL DURING CHILDHOOD   nO loc    Fracture of right foot     MILD HAIR LINE FX   WORE BOOT    Fracture, finger May 13th 2024    GERD (gastroesophageal reflux disease)     Heartburn     for gerd WORKUP    Knee swelling     First noticed the aching/stiff sensation in     Seasonal depression         Past Surgical History:   Procedure Laterality Date    APPENDECTOMY      DENTAL PROCEDURE      ENDOSCOPY N/A 2025    Procedure: ESOPHAGOGASTRODUODENOSCOPY WITH BIOPSY;  Surgeon: Santiago Breaux MD;  Location: [facility];  Service: Gastroenterology;  Laterality: N/A;  gastric bx to r/o h pylori    HERNIA REPAIR      infant    KNEE ARTHROSCOPY WITH PATELLA FEMORAL LIGAMENT RECONSTRUCTION Right 2025    Procedure: Knee arthroscopy, medial patellofemoral ligament reconstruction, possible lateral release, loose removal, meniscal and cartilage surgery as indicated;  Surgeon: Stevenson Jaimes MD;  Location: [facility] OR;  Service: Orthopedics;  Laterality: Right;        PT Ortho       Row Name 25 1300       Subjective    Subjective Comments  Patient reports that his inicision feels like it might be open, he has not observed any drainage or redness.  -SP       Precautions and Contraindications    Precautions per MD protocol  -SP       Posture/Observations    Posture/Observations Comments Incision appears to be healing well with good closure; steri-strips remain in place with no redness or heat noted  -SP       Right Lower Ext    Rt Knee Extension/Flexion AROM 0 to 55 degrees AAROM with heel slide  -SP              User Key  (r) = Recorded By, (t) = Taken By, (c) = Cosigned By      Initials Name Provider Type    Aicha Rai, PT Physical Therapist                                 PT Assessment/Plan       Row Name 07/08/25 1312          PT Assessment    Assessment Comments Patient tolerates progression of knee flexion well 0- 55 degrees with minimal discomfort.  Patient is progressing well per protocol  -SP        PT Plan    PT Plan Comments Continue to progress per protocol  -SP               User Key  (r) = Recorded By, (t) = Taken By, (c) = Cosigned By      Initials Name Provider Type    Aicha Rai, PT Physical Therapist                     Modalities       Row Name 07/08/25 1200             Ice    Ice Applied Yes  with IFC  -SP      Location right knee:  patient in supine  -SP         ELECTRICAL STIMULATION    Attended/Unattended Unattended  -SP      Stimulation Type IFC  -SP      Location/Electrode Placement/Other right knee  -SP                User Key  (r) = Recorded By, (t) = Taken By, (c) = Cosigned By      Initials Name Provider Type    Aicha Rai, PT Physical Therapist                   OP Exercises       Row Name 07/08/25 1313 07/08/25 1300 07/08/25 1200       Precautions    Existing Precautions/Restrictions -- -- other (see comments);brace on at all times;non-weight bearing  Per MD MFPL protocol; brace locked in 0 degrees extension.  -SP       Subjective    Subjective Comments -- Patient reports  that his inicision feels like it might be open, he has not observed any drainage or redness.  -SP --       Total Minutes    29921 - PT Therapeutic Exercise Minutes 30  -SP -- --       Exercise 1    Exercise Name 1 -- -- Heel slides  -SP    Reps 1 -- -- 6 minutes  -SP    Additional Comments -- -- 0-60 degrees per protocol  -SP       Exercise 2    Exercise Name 2 -- -- supine gastroc stretch with sheet  -SP    Cueing 2 -- -- Verbal;Tactile;Demo  -SP    Reps 2 -- -- 10  -SP    Time 2 -- -- 10 sec  -SP       Exercise 3    Exercise Name 3 -- -- Russian stim with QS over single towel roll  -SP    Cueing 3 -- -- Verbal;Tactile;Demo  -SP    Reps 3 -- -- 10/10  -SP    Time 3 -- -- 10 minutes  -SP       Exercise 4    Exercise Name 4 -- -- SLR in brace locked in full extension  -SP    Cueing 4 -- -- Verbal;Tactile;Demo  -SP    Sets 4 -- -- 2  -SP    Reps 4 -- -- 10  -SP       Exercise 5    Exercise Name 5 -- -- hip adduction/ ball squeeze  -SP    Reps 5 -- -- 20  -SP    Time 5 -- -- 3 sec  -SP       Exercise 6    Exercise Name 6 -- -- quad set  -SP    Reps 6 -- -- 15  -SP    Time 6 -- -- 5 sec  -SP              User Key  (r) = Recorded By, (t) = Taken By, (c) = Cosigned By      Initials Name Provider Type    Aicha Rai, AMEENA Physical Therapist                             Manual Rx (Last 36 Hours)       Manual Treatments       Row Name 07/08/25 1300             Manual Rx 1    Manual Rx 1 Location right knee  -SP      Manual Rx 1 Type patella mobilizations: medial glide, superior  and inferior glide  -SP                User Key  (r) = Recorded By, (t) = Taken By, (c) = Cosigned By      Initials Name Provider Type    Aicha Rai, AMEENA Physical Therapist                        Therapy Education  Given: HEP  Program: Reinforced, Progressed  How Provided: Verbal  Provided to: Patient  Level of Understanding: Verbalized, Demonstrated              Time Calculation:   Start Time: 1137  Stop Time: 1245  Time  Calculation (min): 68 min  Timed Charges  91982 - PT Therapeutic Exercise Minutes: 30  Total Minutes  Timed Charges Total Minutes: 30   Total Minutes: 30                Aicha Daniels, PT  7/8/2025

## 2025-07-11 ENCOUNTER — HOSPITAL ENCOUNTER (OUTPATIENT)
Dept: PHYSICAL THERAPY | Facility: HOSPITAL | Age: 30
Setting detail: THERAPIES SERIES
Discharge: HOME | End: 2025-07-11
Payer: COMMERCIAL

## 2025-07-11 DIAGNOSIS — M23.41 BODIES, LOOSE, JOINT, KNEE, RIGHT: ICD-10-CM

## 2025-07-11 DIAGNOSIS — M25.361 PATELLAR INSTABILITY OF RIGHT KNEE: Primary | ICD-10-CM

## 2025-07-11 PROCEDURE — 97110 THERAPEUTIC EXERCISES: CPT

## 2025-07-11 PROCEDURE — G0283 ELEC STIM OTHER THAN WOUND: HCPCS

## 2025-07-11 NOTE — THERAPY TREATMENT NOTE
Outpatient Physical Therapy Ortho Treatment Note   Nikita     Patient Name: Dagoberto Diana  : 1995  MRN: [id number]  Today's Date: 2025      Visit Date: 2025    Visit Dx:    ICD-10-CM ICD-9-CM   1. Patellar instability of right knee  M25.361 718.86   2. Bodies, loose, joint, knee, right  M23.41 717.6       Patient Active Problem List   Diagnosis    Heartburn    Patellar instability of right knee    Bodies, loose, joint, knee, right    Gastritis without bleeding    Chronic idiopathic constipation    status post right knee medial patellofemoral ligament reconstruction with allograft for patellar instability proximal realignment, arthroscopy with loose body removal, lateral lease, DOS 2025        Past Medical History:   Diagnosis Date    Ankle sprain     Handful of times over the years    Asthma     CHILDHOOD    CHI (closed head injury)     SEVERAL DURING CHILDHOOD   nO loc    Fracture of right foot     MILD HAIR LINE FX   WORE BOOT    Fracture, finger May 13th 2024    GERD (gastroesophageal reflux disease)     Heartburn     for gerd WORKUP    Knee swelling     First noticed the aching/stiff sensation in     Seasonal depression         Past Surgical History:   Procedure Laterality Date    APPENDECTOMY      DENTAL PROCEDURE      ENDOSCOPY N/A 2025    Procedure: ESOPHAGOGASTRODUODENOSCOPY WITH BIOPSY;  Surgeon: Santiago Breaux MD;  Location: [facility];  Service: Gastroenterology;  Laterality: N/A;  gastric bx to r/o h pylori    HERNIA REPAIR      infant    KNEE ARTHROSCOPY WITH PATELLA FEMORAL LIGAMENT RECONSTRUCTION Right 2025    Procedure: Knee arthroscopy, medial patellofemoral ligament reconstruction, possible lateral release, loose removal, meniscal and cartilage surgery as indicated;  Surgeon: Stevenson Jaimes MD;  Location: [facility] OR;  Service: Orthopedics;  Laterality: Right;        PT Ortho       Row Name 25 1000       Subjective    Subjective Comments  "pt states he is doing ok - complains of \"arthritis\" type pain/ache  -       Precautions and Contraindications    Precautions per MD protocol  -       Right Lower Ext    Rt Knee Extension/Flexion AROM 55 degrees flexion pre stretch; 80 post stretches  -              User Key  (r) = Recorded By, (t) = Taken By, (c) = Cosigned By      Initials Name Provider Type     Thor Hernandez PTA Physical Therapist Assistant                                 PT Assessment/Plan       Row Name 07/11/25 1114          PT Assessment    Assessment Comments pt demonstrates increased ROM and tolerated ex's well; fatigue reported by end of inceased reps of SLR (brace on)  -        PT Plan    PT Plan Comments Cont to progress as tolerated per protocol  -               User Key  (r) = Recorded By, (t) = Taken By, (c) = Cosigned By      Initials Name Provider Type     Thor Hernandez PTA Physical Therapist Assistant                     Modalities       Row Name 07/11/25 1000             Ice    Location (R) knee w/IFC - pt supine  -      PT Ice Rx Minutes 15  -      Ice S/P Rx Yes  -         ELECTRICAL STIMULATION    Attended/Unattended Unattended  -      Stimulation Type IFC  -      Location/Electrode Placement/Other (R) knee - post Rx w/CP  -      PT E-Stim Unattended Minutes 15  -                User Key  (r) = Recorded By, (t) = Taken By, (c) = Cosigned By      Initials Name Provider Type     Thor Hernandez PTA Physical Therapist Assistant                   OP Exercises       Row Name 07/11/25 1116 07/11/25 1000          Precautions    Existing Precautions/Restrictions -- other (see comments);brace on at all times;non-weight bearing  Per MD MFPL protocol; brace locked in 0 degrees extension.  -        Subjective    Subjective Comments -- pt states he is doing ok - complains of \"arthritis\" type pain/ache  -        Total Minutes    92673 - PT Therapeutic Exercise Minutes 35  - --        Exercise 1    " Exercise Name 1 -- Heel slides  -     Cueing 1 -- Verbal  -MH     Reps 1 -- 8 min  -MH     Time 1 -- 0 - 90 degrees per protocol  -        Exercise 2    Exercise Name 2 -- supine gastroc stretch with sheet  -     Cueing 2 -- Verbal  -MH     Reps 2 -- 10  -MH     Time 2 -- 10 sec  -MH        Exercise 3    Exercise Name 3 -- Russian stim with QS over single towel roll  -     Cueing 3 -- Verbal  -MH     Reps 3 -- 10/10  -MH     Time 3 -- 10 minutes  -        Exercise 4    Exercise Name 4 -- quad set  -     Cueing 4 -- Verbal  -MH     Sets 4 -- --  -MH     Reps 4 -- 20  -MH     Time 4 -- 5 sec  -MH        Exercise 5    Exercise Name 5 -- hip adduction/ ball squeeze  -     Cueing 5 -- Verbal  -MH     Reps 5 -- 20  -MH     Time 5 -- 5 sec  -MH        Exercise 6    Exercise Name 6 -- SLR in brace locked in full extension  -     Cueing 6 -- Verbal  -MH     Sets 6 -- 2  -MH     Reps 6 -- 15  -MH     Time 6 -- --  -MH               User Key  (r) = Recorded By, (t) = Taken By, (c) = Cosigned By      Initials Name Provider Type    Thor Saenz PTA Physical Therapist Assistant                             Manual Rx (Last 36 Hours)       Manual Treatments       Row Name 07/11/25 1000             Manual Rx 1    Manual Rx 1 Location right knee  -      Manual Rx 1 Type patella mobilizations: medial glide, superior  and inferior glide  -      Manual Rx 1 Duration 20 each  -                User Key  (r) = Recorded By, (t) = Taken By, (c) = Cosigned By      Initials Name Provider Type    Thor Saenz PTA Physical Therapist Assistant                        Therapy Education  Given: HEP, Symptoms/condition management  Program: Reinforced  How Provided: Verbal, Demonstration  Provided to: Patient  Level of Understanding: Teach back education performed, Verbalized, Demonstrated              Time Calculation:   Start Time: 0956  Stop Time: 1100  Time Calculation (min): 64 min  Timed Charges  08702 - PT  Therapeutic Exercise Minutes: 35  Untimed Charges  PT E-Stim Unattended Minutes: 15  PT Ice Rx Minutes: 15  Total Minutes  Timed Charges Total Minutes: 35  Untimed Charges Total Minutes: 30   Total Minutes: 35  Therapy Charges for Today       Code Description Service Date Service Provider Modifiers Qty    29666265715 HC PT THER PROC EA 15 MIN 7/11/2025 Thor Hernandez, SOPHIA GP 2    68326116392 HC PT ELECTRICAL STIM UNATTENDED 7/11/2025 Thor Hernandez, SOPHIA  1                      Thor Hernandez PTA  7/11/2025

## 2025-07-15 ENCOUNTER — HOSPITAL ENCOUNTER (OUTPATIENT)
Dept: PHYSICAL THERAPY | Facility: HOSPITAL | Age: 30
Setting detail: THERAPIES SERIES
Discharge: HOME | End: 2025-07-15
Payer: COMMERCIAL

## 2025-07-15 DIAGNOSIS — M23.41 BODIES, LOOSE, JOINT, KNEE, RIGHT: ICD-10-CM

## 2025-07-15 DIAGNOSIS — M25.361 PATELLAR INSTABILITY OF RIGHT KNEE: Primary | ICD-10-CM

## 2025-07-15 PROCEDURE — 97110 THERAPEUTIC EXERCISES: CPT

## 2025-07-15 PROCEDURE — G0283 ELEC STIM OTHER THAN WOUND: HCPCS

## 2025-07-15 NOTE — THERAPY TREATMENT NOTE
Outpatient Physical Therapy Ortho Treatment Note   Nikita     Patient Name: Dagoberto Diana  : 1995  MRN: [id number]  Today's Date: 7/15/2025      Visit Date: 07/15/2025    Visit Dx:    ICD-10-CM ICD-9-CM   1. Patellar instability of right knee  M25.361 718.86   2. Bodies, loose, joint, knee, right  M23.41 717.6       Patient Active Problem List   Diagnosis    Heartburn    Patellar instability of right knee    Bodies, loose, joint, knee, right    Gastritis without bleeding    Chronic idiopathic constipation    status post right knee medial patellofemoral ligament reconstruction with allograft for patellar instability proximal realignment, arthroscopy with loose body removal, lateral lease, DOS 2025        Past Medical History:   Diagnosis Date    Ankle sprain     Handful of times over the years    Asthma     CHILDHOOD    CHI (closed head injury)     SEVERAL DURING CHILDHOOD   nO loc    Fracture of right foot     MILD HAIR LINE FX   WORE BOOT    Fracture, finger May 13th 2024    GERD (gastroesophageal reflux disease)     Heartburn     for gerd WORKUP    Knee swelling     First noticed the aching/stiff sensation in     Seasonal depression         Past Surgical History:   Procedure Laterality Date    APPENDECTOMY      DENTAL PROCEDURE      ENDOSCOPY N/A 2025    Procedure: ESOPHAGOGASTRODUODENOSCOPY WITH BIOPSY;  Surgeon: Santiago Breaux MD;  Location: [facility];  Service: Gastroenterology;  Laterality: N/A;  gastric bx to r/o h pylori    HERNIA REPAIR      infant    KNEE ARTHROSCOPY WITH PATELLA FEMORAL LIGAMENT RECONSTRUCTION Right 2025    Procedure: Knee arthroscopy, medial patellofemoral ligament reconstruction, possible lateral release, loose removal, meniscal and cartilage surgery as indicated;  Surgeon: Stevenson Jaimes MD;  Location: [facility] OR;  Service: Orthopedics;  Laterality: Right;        PT Ortho       Row Name 07/15/25 1600       Posture/Observations     Posture/Observations Comments Area around medial incision is pink/light red but no appearance of streaking, drainage or warmth. Pt denies increased soreness.  -      Row Name 07/15/25 1500       Subjective    Subjective Comments pt reports knee is doing well - ready to start being able to put weight on it; pt concerned about medial incision looking red but thinking it may be from the brace  -       Precautions and Contraindications    Precautions per MD protocol  -      Row Name 07/15/25 1400       Right Lower Ext    Rt Knee Extension/Flexion AROM 78 degrees AAROM flexion pre stretch; 90 degrees post stretch  -              User Key  (r) = Recorded By, (t) = Taken By, (c) = Cosigned By      Initials Name Provider Type     Thor Hernandez PTA Physical Therapist Assistant                                 PT Assessment/Plan       Row Name 07/15/25 1627          PT Assessment    Assessment Comments pt continues to progress with ROM, meeting current limit per protocol; able to progress reps of ex's with fatigue but no complaints of pain; will continue to monitor medial incision  -        PT Plan    PT Plan Comments Cont per progress as tolerated per protocol  -               User Key  (r) = Recorded By, (t) = Taken By, (c) = Cosigned By      Initials Name Provider Type     Thor Hernandez PTA Physical Therapist Assistant                     Modalities       Row Name 07/15/25 1500             Precautions    Existing Precautions/Restrictions other (see comments);brace on at all times;non-weight bearing  Per MD MFPL protocol; brace locked in 0 degrees extension.  -         Ice    Location (R) knee w/IFC - pt supine  -      PT Ice Rx Minutes 15  -      Ice S/P Rx Yes  -         ELECTRICAL STIMULATION    Attended/Unattended Unattended  -      Stimulation Type IFC  -      Location/Electrode Placement/Other (R) knee - post Rx w/CP  -      PT E-Stim Unattended Minutes 15  -                User  Key  (r) = Recorded By, (t) = Taken By, (c) = Cosigned By      Initials Name Provider Type    Thor Saenz PTA Physical Therapist Assistant                   OP Exercises       Row Name 07/15/25 1630 07/15/25 1500          Precautions    Existing Precautions/Restrictions -- other (see comments);brace on at all times;non-weight bearing  Per MD MFPL protocol; brace locked in 0 degrees extension.  -        Subjective    Subjective Comments -- pt reports knee is doing well - ready to start being able to put weight on it; pt concerned about medial incision looking red but thinking it may be from the brace  -        Total Minutes    86056 - PT Therapeutic Exercise Minutes 40  -MH --        Exercise 1    Exercise Name 1 -- Heel slides  -     Cueing 1 -- Verbal  -     Reps 1 -- 8 min  -MH     Time 1 -- 0 - 90 degrees per protocol  -        Exercise 2    Exercise Name 2 -- supine gastroc stretch with sheet  -     Cueing 2 -- Verbal  -     Reps 2 -- 10  -MH     Time 2 -- 10 sec  -        Exercise 3    Exercise Name 3 -- Russian stim with QS over single towel roll  -     Cueing 3 -- Verbal  -     Reps 3 -- 10/10  -MH     Time 3 -- 10 minutes  -        Exercise 4    Exercise Name 4 -- quad set  -     Cueing 4 -- Verbal  -     Reps 4 -- 30  -MH     Time 4 -- 5 sec  -MH        Exercise 5    Exercise Name 5 -- hip adduction/ ball squeeze  -     Cueing 5 -- Verbal  -MH     Reps 5 -- 30  -MH     Time 5 -- 5 sec  -        Exercise 6    Exercise Name 6 -- SLR in brace locked in full extension  -     Cueing 6 -- Verbal  -     Sets 6 -- 2  -MH     Reps 6 -- 20  -MH               User Key  (r) = Recorded By, (t) = Taken By, (c) = Cosigned By      Initials Name Provider Type     Thor Hernandez PTA Physical Therapist Assistant                                     Therapy Education  Given: HEP, Symptoms/condition management  Program: Reinforced, Progressed  How Provided: Verbal,  Demonstration  Provided to: Patient  Level of Understanding: Teach back education performed, Verbalized, Demonstrated              Time Calculation:   Start Time: 1456  Stop Time: 1600  Time Calculation (min): 64 min  Timed Charges  46670 - PT Therapeutic Exercise Minutes: 40  Untimed Charges  PT E-Stim Unattended Minutes: 15  PT Ice Rx Minutes: 15  Total Minutes  Timed Charges Total Minutes: 40  Untimed Charges Total Minutes: 30   Total Minutes: 40  Therapy Charges for Today       Code Description Service Date Service Provider Modifiers Qty    21863076779 HC PT ELECTRICAL STIM UNATTENDED 7/15/2025 Thor Hernandez, PTA  1    03046118932 HC PT THER PROC EA 15 MIN 7/15/2025 Thor Hernandez PTA GP 2                      Thor Hernandez PTA  7/15/2025

## 2025-07-18 ENCOUNTER — HOSPITAL ENCOUNTER (OUTPATIENT)
Dept: PHYSICAL THERAPY | Facility: HOSPITAL | Age: 30
Setting detail: THERAPIES SERIES
Discharge: HOME | End: 2025-07-18
Payer: COMMERCIAL

## 2025-07-18 DIAGNOSIS — M25.361 PATELLAR INSTABILITY OF RIGHT KNEE: Primary | ICD-10-CM

## 2025-07-18 PROCEDURE — G0283 ELEC STIM OTHER THAN WOUND: HCPCS

## 2025-07-18 PROCEDURE — 97110 THERAPEUTIC EXERCISES: CPT

## 2025-07-18 NOTE — THERAPY TREATMENT NOTE
Outpatient Physical Therapy Ortho Treatment Note   Nikita     Patient Name: Dagoberto Diana  : 1995  MRN: [id number]  Today's Date: 2025      Visit Date: 2025    Visit Dx:    ICD-10-CM ICD-9-CM   1. Patellar instability of right knee  M25.361 718.86       Patient Active Problem List   Diagnosis    Heartburn    Patellar instability of right knee    Bodies, loose, joint, knee, right    Gastritis without bleeding    Chronic idiopathic constipation    status post right knee medial patellofemoral ligament reconstruction with allograft for patellar instability proximal realignment, arthroscopy with loose body removal, lateral lease, DOS 2025        Past Medical History:   Diagnosis Date    Ankle sprain     Handful of times over the years    Asthma     CHILDHOOD    CHI (closed head injury)     SEVERAL DURING CHILDHOOD   nO loc    Fracture of right foot     MILD HAIR LINE FX   WORE BOOT    Fracture, finger May 13th 2024    GERD (gastroesophageal reflux disease)     Heartburn     for gerd WORKUP    Knee swelling     First noticed the aching/stiff sensation in     Seasonal depression         Past Surgical History:   Procedure Laterality Date    APPENDECTOMY      DENTAL PROCEDURE      ENDOSCOPY N/A 2025    Procedure: ESOPHAGOGASTRODUODENOSCOPY WITH BIOPSY;  Surgeon: Santiago Breaux MD;  Location: [facility] OR;  Service: Gastroenterology;  Laterality: N/A;  gastric bx to r/o h pylori    HERNIA REPAIR      infant    KNEE ARTHROSCOPY WITH PATELLA FEMORAL LIGAMENT RECONSTRUCTION Right 2025    Procedure: Knee arthroscopy, medial patellofemoral ligament reconstruction, possible lateral release, loose removal, meniscal and cartilage surgery as indicated;  Surgeon: Stevenson Jaimes MD;  Location: [facility] OR;  Service: Orthopedics;  Laterality: Right;        PT Ortho       Row Name 25 0900       Subjective    Subjective Comments pt with no new complaints; reports medial incision  looking better and no longer red  -       Precautions and Contraindications    Precautions per MD protocol  -       Right Lower Ext    Rt Knee Extension/Flexion AROM 88 degrees A/AROM flexion pre stetch; 98 degrees post stretch  -              User Key  (r) = Recorded By, (t) = Taken By, (c) = Cosigned By      Initials Name Provider Type     Thor Hernandez PTA Physical Therapist Assistant                                 PT Assessment/Plan       Row Name 07/18/25 1148          PT Assessment    Assessment Comments pt continues to progress with ROM and tolerates increased reps of ex's; pt completing ex's per protocol without difficulty  -        PT Plan    PT Plan Comments Cont per POC - pt to MD after next appt  -               User Key  (r) = Recorded By, (t) = Taken By, (c) = Cosigned By      Initials Name Provider Type     Thor Hernandez PTA Physical Therapist Assistant                     Modalities       Row Name 07/18/25 0900             Ice    Location (R) knee w/IFC - pt supine  -      PT Ice Rx Minutes 15  -      Ice S/P Rx Yes  -         ELECTRICAL STIMULATION    Attended/Unattended Unattended  -      Stimulation Type IFC  -      Location/Electrode Placement/Other (R) knee - post Rx w/CP  -      PT E-Stim Unattended Minutes 15  -                User Key  (r) = Recorded By, (t) = Taken By, (c) = Cosigned By      Initials Name Provider Type     Thor Hernandez PTA Physical Therapist Assistant                   OP Exercises       Row Name 07/18/25 1204 07/18/25 0900          Precautions    Existing Precautions/Restrictions -- other (see comments);brace on at all times;non-weight bearing  Per MD MFPL protocol; brace locked in 0 degrees extension.  -        Subjective    Subjective Comments -- pt with no new complaints; reports medial incision looking better and no longer red  -        Total Minutes    13287 - PT Therapeutic Exercise Minutes 40  - --        Exercise 1     Exercise Name 1 -- Heel slides  -     Cueing 1 -- Verbal  -     Reps 1 -- 8 min  -MH     Time 1 -- 0 - 120 degrees per protocol  -        Exercise 2    Exercise Name 2 -- supine gastroc stretch with sheet  -     Cueing 2 -- Verbal  -MH     Reps 2 -- 10  -MH     Time 2 -- 10 sec  -MH        Exercise 3    Exercise Name 3 -- Russian stim with QS over single towel roll  -     Cueing 3 -- Verbal  -MH     Reps 3 -- 10/10  -MH     Time 3 -- 10 minutes  -        Exercise 4    Exercise Name 4 -- quad set  -     Cueing 4 -- Verbal  -     Reps 4 -- 40  -MH     Time 4 -- 5 sec  -MH        Exercise 5    Exercise Name 5 -- hip adduction/ ball squeeze  -     Cueing 5 -- Verbal  -MH     Reps 5 -- 40  -MH     Time 5 -- 5 sec  -MH        Exercise 6    Exercise Name 6 -- SLR in brace locked in full extension  -     Cueing 6 -- Verbal  -MH     Sets 6 -- 2  -MH     Reps 6 -- 20  -MH               User Key  (r) = Recorded By, (t) = Taken By, (c) = Cosigned By      Initials Name Provider Type     Thor Hernandez PTA Physical Therapist Assistant                                     Therapy Education  Given: HEP, Symptoms/condition management  Program: Reinforced  How Provided: Verbal, Demonstration  Provided to: Patient  Level of Understanding: Teach back education performed, Verbalized, Demonstrated              Time Calculation:   Start Time: 0928  Stop Time: 1035  Time Calculation (min): 67 min  Timed Charges  77319 - PT Therapeutic Exercise Minutes: 40  Untimed Charges  PT E-Stim Unattended Minutes: 15  PT Ice Rx Minutes: 15  Total Minutes  Timed Charges Total Minutes: 40  Untimed Charges Total Minutes: 30   Total Minutes: 40  Therapy Charges for Today       Code Description Service Date Service Provider Modifiers Qty    31508027600 HC PT THER PROC EA 15 MIN 7/18/2025 Thor Hernandez PTA GP 3    [id number] HC PT ELECTRICAL STIM UNATTENDED 7/18/2025 Thor Hernandez PTA  1                      Thor Hernandez  PTA  7/18/2025

## 2025-07-23 ENCOUNTER — OFFICE VISIT (OUTPATIENT)
Dept: ORTHOPEDIC SURGERY | Facility: CLINIC | Age: 30
End: 2025-07-23
Payer: COMMERCIAL

## 2025-07-23 ENCOUNTER — HOSPITAL ENCOUNTER (OUTPATIENT)
Dept: PHYSICAL THERAPY | Facility: HOSPITAL | Age: 30
Setting detail: THERAPIES SERIES
Discharge: HOME | End: 2025-07-23
Payer: COMMERCIAL

## 2025-07-23 VITALS — HEIGHT: 64 IN | WEIGHT: 210 LBS | BODY MASS INDEX: 35.85 KG/M2

## 2025-07-23 DIAGNOSIS — M23.41 BODIES, LOOSE, JOINT, KNEE, RIGHT: ICD-10-CM

## 2025-07-23 DIAGNOSIS — M25.361 PATELLAR INSTABILITY OF RIGHT KNEE: Primary | ICD-10-CM

## 2025-07-23 DIAGNOSIS — Z98.890 STATUS POST ARTHROSCOPIC KNEE SURGERY: Primary | ICD-10-CM

## 2025-07-23 PROCEDURE — 97110 THERAPEUTIC EXERCISES: CPT | Performed by: PHYSICAL THERAPIST

## 2025-07-23 NOTE — THERAPY TREATMENT NOTE
Outpatient Physical Therapy Ortho Treatment Note   Nikita     Patient Name: Dagoberto Diana  : 1995  MRN: [id number]  Today's Date: 2025      Visit Date: 2025    Visit Dx:    ICD-10-CM ICD-9-CM   1. Patellar instability of right knee  M25.361 718.86   2. Bodies, loose, joint, knee, right  M23.41 717.6       Patient Active Problem List   Diagnosis    Heartburn    Patellar instability of right knee    Bodies, loose, joint, knee, right    Gastritis without bleeding    Chronic idiopathic constipation    status post right knee medial patellofemoral ligament reconstruction with allograft for patellar instability proximal realignment, arthroscopy with loose body removal, lateral lease, DOS 2025        Past Medical History:   Diagnosis Date    Ankle sprain     Handful of times over the years    Asthma     CHILDHOOD    CHI (closed head injury)     SEVERAL DURING CHILDHOOD   nO loc    Fracture of right foot     MILD HAIR LINE FX   WORE BOOT    Fracture, finger May 13th 2024    GERD (gastroesophageal reflux disease)     Heartburn     for gerd WORKUP    Knee swelling     First noticed the aching/stiff sensation in     Seasonal depression         Past Surgical History:   Procedure Laterality Date    APPENDECTOMY      DENTAL PROCEDURE      ENDOSCOPY N/A 2025    Procedure: ESOPHAGOGASTRODUODENOSCOPY WITH BIOPSY;  Surgeon: Santiago Breaux MD;  Location: [facility];  Service: Gastroenterology;  Laterality: N/A;  gastric bx to r/o h pylori    HERNIA REPAIR      infant    KNEE ARTHROSCOPY WITH PATELLA FEMORAL LIGAMENT RECONSTRUCTION Right 2025    Procedure: Knee arthroscopy, medial patellofemoral ligament reconstruction, possible lateral release, loose removal, meniscal and cartilage surgery as indicated;  Surgeon: Stevenson Jaimes MD;  Location: [facility] OR;  Service: Orthopedics;  Laterality: Right;        PT Ortho       Row Name 25 1200       Subjective    Subjective Comments  Patient reports that he is improving and is anxious to discontinue use of walker.  He reports that his knee continues to feel stiff and tight.  Patient has been doing exercises at home.  -SP       Precautions and Contraindications    Precautions per MD protocol  -SP       Posture/Observations    Observations Edema;Incision healing;Muscle atrophy  -SP    Posture/Observations Comments Patient ambulating with knee hinge brace locked in extension and is using walker NWB right LE;  incision well healed.  Moderate swelling present surrounding right knee.  -SP       Knee Palpation    Patella Right:;Tender;Swollen  -SP       Patellar Accessory Motions    Superior glide Right:;Hypomobile  mild limitation  -SP    Inferior glide Right:;Hypomobile  mild limitation  -SP    Medial glide Right:;WNL  -SP    Lateral glide --  NT per MD protocol  -SP       Right Lower Ext    Rt Knee Extension/Flexion AROM 0 to 87 degrees prior to treatment:   0 to 109 degrees with AAROM knee flexion stretch in supine.  -SP       Sensation    Sensation WNL? WNL  -SP       Lower Extremity Flexibility    Hamstrings Right:;Mildly limited;Moderately limited  -SP    Gastrocnemius Right:;Mildly limited  -SP    Soleus Right:;Mildly limited  -SP       Transfers    Sit-Stand [place] (Transfers) independent  -SP    Stand-Sit [place] (Transfers) independent  -SP    Transfers, Sit-Stand-Sit, Assist Device standard walker  NWB/TDWB R leg  -SP       Gait/Stairs (Locomotion)    [place] Level (Gait) independent  -SP    Assistive Device (Gait) walker, standard  -SP    Deviations/Abnormal Patterns (Gait) right sided deviations;antalgic;gait speed decreased  Pt is NWB/TDWB R leg in brace locked in full extension.  -SP    Right Sided Gait Deviations forward flexed posture  -SP              User Key  (r) = Recorded By, (t) = Taken By, (c) = Cosigned By      Initials Name Provider Type    Aicha Rai, PT Physical Therapist                                  PT Assessment/Plan       Row Name 07/23/25 1456          PT Assessment    Assessment Comments Patient demonstrates progressing right knee ROM.  He continues to have moderate edema and generalized tightness in right LE  -SP        PT Plan    PT Plan Comments Continue to progress as tolerable  -SP               User Key  (r) = Recorded By, (t) = Taken By, (c) = Cosigned By      Initials Name Provider Type    SP Aicha Daniels, PT Physical Therapist                       OP Exercises       Row Name 07/23/25 1459 07/23/25 1200 07/23/25 0900       Precautions    Existing Precautions/Restrictions -- -- other (see comments);brace on at all times;non-weight bearing  Per MD MFPL protocol; brace locked in 0 degrees extension.  -SP       Subjective    Subjective Comments -- Patient reports that he is improving and is anxious to discontinue use of walker.  He reports that his knee continues to feel stiff and tight.  Patient has been doing exercises at home.  -SP --       Total Minutes    39407 - PT Therapeutic Exercise Minutes 15  -SP -- --       Exercise 1    Exercise Name 1 -- -- Heel slides  -SP    Cueing 1 -- -- Verbal  -SP    Reps 1 -- -- 8 min  -SP    Time 1 -- -- 0 - 120 degrees per protocol  -SP       Exercise 2    Exercise Name 2 -- -- supine gastroc stretch with sheet  -SP    Cueing 2 -- -- Verbal  -SP    Reps 2 -- -- 10  -SP    Time 2 -- -- 10 sec  -SP       Exercise 3    Exercise Name 3 -- -- Russian stim with QS over single towel roll  -SP    Cueing 3 -- -- Verbal  -SP    Reps 3 -- -- 10/10  -SP    Time 3 -- -- 10 minutes  -SP       Exercise 4    Exercise Name 4 -- -- quad set  -SP    Cueing 4 -- -- Verbal  -SP    Reps 4 -- -- 40  -SP    Time 4 -- -- 5 sec  -SP       Exercise 5    Exercise Name 5 -- -- hip adduction/ ball squeeze  -SP    Cueing 5 -- -- Verbal  -SP    Reps 5 -- -- 40  -SP    Time 5 -- -- 5 sec  -SP       Exercise 6    Exercise Name 6 -- -- SLR in brace locked in full  extension  -SP    Cueing 6 -- -- Verbal  -SP    Sets 6 -- -- 2  -SP    Reps 6 -- -- 20  -SP              User Key  (r) = Recorded By, (t) = Taken By, (c) = Cosigned By      Initials Name Provider Type    Aicha Rai, PT Physical Therapist                             Manual Rx (Last 36 Hours)       Manual Treatments       Row Name 07/23/25 1300             Manual Rx 1    Manual Rx 1 Location right knee  -SP      Manual Rx 1 Type patella mobilizations: medial glide, superior  and inferior glide  -SP      Manual Rx 1 Duration 20 each  -SP                User Key  (r) = Recorded By, (t) = Taken By, (c) = Cosigned By      Initials Name Provider Type    Aicha Rai, PT Physical Therapist                        Therapy Education  Given: HEP  Program: Reinforced  How Provided: Verbal  Provided to: Patient  Level of Understanding: Verbalized, Demonstrated              Time Calculation:   Start Time: 0938  Stop Time: 1035  Time Calculation (min): 57 min  Timed Charges  24746 - PT Therapeutic Exercise Minutes: 15  Total Minutes  Timed Charges Total Minutes: 15   Total Minutes: 15  Therapy Charges for Today       Code Description Service Date Service Provider Modifiers Qty    03470323293  PT THER PROC EA 15 MIN 7/23/2025 Aicha Daniels, PT GP 1                      Aicha Daniels, PT  7/23/2025

## 2025-07-23 NOTE — PROGRESS NOTES
"Subjective:     Patient ID: Dagoberto Diana is a 29 y.o. male.    Chief Complaint:  Follow-up status post right knee MPFL reconstruction, loose body removal, lateral release  History of Present Illness  Dagoberto returns clinic today follow-up evaluation regards to his right knee overall doing fairly well at this point in time.  He is working with physical therapy on range of motion.  Continuing to use walker for assistance, using his knee brace.  No fevers chills or sweats.  No issues with incision.  Social History     Occupational History    Not on file   Tobacco Use    Smoking status: Never     Passive exposure: Yes    Smokeless tobacco: Never    Tobacco comments:     Very rarely smoke a single cigarette. Social/Passive use only   Vaping Use    Vaping status: Never Used   Substance and Sexual Activity    Alcohol use: Yes     Alcohol/week: 2.0 standard drinks of alcohol     Types: 2 Shots of liquor per week     Comment: \"socially\"     Drug use: Not Currently     Types: Marijuana     Comment: LAST USE APRIL 2025    Sexual activity: Yes     Partners: Female     Birth control/protection: Depo-provera      Past Medical History:   Diagnosis Date    Ankle sprain     Handful of times over the years    Asthma     CHILDHOOD    [facility] (closed head injury)     SEVERAL DURING CHILDHOOD   nO loc    Fracture of right foot     MILD HAIR LINE FX   WORE BOOT    Fracture, finger May 13th 2024    GERD (gastroesophageal reflux disease)     Heartburn     for gerd WORKUP    Knee swelling     First noticed the aching/stiff sensation in 2017    Seasonal depression      Past Surgical History:   Procedure Laterality Date    APPENDECTOMY      DENTAL PROCEDURE      ENDOSCOPY N/A 02/05/2025    Procedure: ESOPHAGOGASTRODUODENOSCOPY WITH BIOPSY;  Surgeon: Santiago Breaux MD;  Location: [facility] OR;  Service: Gastroenterology;  Laterality: N/A;  gastric bx to r/o h pylori    HERNIA REPAIR      infant    KNEE ARTHROSCOPY WITH PATELLA FEMORAL " "LIGAMENT RECONSTRUCTION Right 6/27/2025    Procedure: Knee arthroscopy, medial patellofemoral ligament reconstruction, possible lateral release, loose removal, meniscal and cartilage surgery as indicated;  Surgeon: Stevenson Jaimes MD;  Location: [facility];  Service: Orthopedics;  Laterality: Right;       Family History   Problem Relation Age of Onset    Crohn's disease Mother     Crohn's disease Maternal Grandmother     Cancer Paternal Grandfather         Lung Cancer    Malig Hyperthermia Neg Hx          Review of Systems        Objective:  Vitals:    07/23/25 1046   Weight: 95.3 kg (210 lb)   Height: 162.6 cm (64.02\")         07/23/25  1046   Weight: 95.3 kg (210 lb)     Body mass index is 36.03 kg/m².    Physical Exam    Vital signs reviewed.   General: No acute distress, alert and oriented  Eyes: conjunctiva clear; pupils equally round and reactive  ENT: external ears and nose atraumatic; oropharynx clear  CV: no peripheral edema  Resp: normal respiratory effort  Skin: no rashes or wounds; normal turgor  Psych: mood and affect appropriate; recent and remote memory intact       Physical Exam  Right knee-midline incision and medial incision well-healed, clean dry and intact.  Moderate effusion.  Knee range of motion 0 to 100 degrees, 4 out of 5 strength on extension, minimal extensor lag on straight leg raise.  Midline patella tracking.        Imaging:  Three-view x-rays right knee from today's visit AP, lateral, patella axial views, ordered and reviewed by me, indication status post MPFL reconstruction indicates interval removal of loose body from suprapatellar pouch with retained loose body in the posterior compartment.  Improved patellofemoral alignment noted on axial view.  Moderate residual effusion.  Femoral tunnel stable from MPFL reconstruction    Assessment:        1. Status post arthroscopic knee surgery             Assessment & Plan  Overall Dagoberto is doing well at this point in time-good recovery " in regards to his motion.  He can weight-bear as tolerated, discontinue walker or crutches, keep brace locked in extension until he feels comfortable with further ambulation.  Follow-up in 4 weeks for reassessment.  Likely transition to J brace at that time.  Continue progression with motion.  Anti-inflammatories, ice, compression for swelling.    Dagoberto Diana was in agreement with plan and had all questions answered.     Orders:  Orders Placed This Encounter   Procedures    XR Knee 3+ View With [place] Right       Medications:  No orders of the defined types were placed in this encounter.      Followup:  No follow-ups on file.    Diagnoses and all orders for this visit:    1. Status post arthroscopic knee surgery (Primary)  -     XR Knee 3+ View With [place] Right                   Dictated utilizing Dragon dictation     Patient or patient representative verbalized consent for the use of Ambient Listening during the visit with  Stevenson Jaimes MD for chart documentation. 7/23/2025  11:14 EDT

## 2025-07-25 ENCOUNTER — HOSPITAL ENCOUNTER (OUTPATIENT)
Dept: PHYSICAL THERAPY | Facility: HOSPITAL | Age: 30
Setting detail: THERAPIES SERIES
Discharge: HOME | End: 2025-07-25
Payer: COMMERCIAL

## 2025-07-25 DIAGNOSIS — M25.361 PATELLAR INSTABILITY OF RIGHT KNEE: Primary | ICD-10-CM

## 2025-07-25 DIAGNOSIS — M23.41 BODIES, LOOSE, JOINT, KNEE, RIGHT: ICD-10-CM

## 2025-07-25 PROCEDURE — 97110 THERAPEUTIC EXERCISES: CPT | Performed by: PHYSICAL THERAPIST

## 2025-07-25 PROCEDURE — 97140 MANUAL THERAPY 1/> REGIONS: CPT | Performed by: PHYSICAL THERAPIST

## 2025-07-25 NOTE — THERAPY RE-EVALUATION
Outpatient Physical Therapy Ortho Re-Evaluation   [facility]     Patient Name: Dagoberto Diana  : 1995  MRN: [id number]  Today's Date: 2025      Visit Date: 2025    Patient Active Problem List   Diagnosis    Heartburn    Patellar instability of right knee    Bodies, loose, joint, knee, right    Gastritis without bleeding    Chronic idiopathic constipation    status post right knee medial patellofemoral ligament reconstruction with allograft for patellar instability proximal realignment, arthroscopy with loose body removal, lateral lease, DOS 2025        Past Medical History:   Diagnosis Date    Ankle sprain     Handful of times over the years    Asthma     CHILDHOOD    CHI (closed head injury)     SEVERAL DURING CHILDHOOD   nO loc    Fracture of right foot     MILD HAIR LINE FX   WORE BOOT    Fracture, finger May 13th 2024    GERD (gastroesophageal reflux disease)     Heartburn     for gerd WORKUP    Knee swelling     First noticed the aching/stiff sensation in     Seasonal depression         Past Surgical History:   Procedure Laterality Date    APPENDECTOMY      DENTAL PROCEDURE      ENDOSCOPY N/A 2025    Procedure: ESOPHAGOGASTRODUODENOSCOPY WITH BIOPSY;  Surgeon: Santiago Breaux MD;  Location: [facility] OR;  Service: Gastroenterology;  Laterality: N/A;  gastric bx to r/o h pylori    HERNIA REPAIR      infant    KNEE ARTHROSCOPY WITH PATELLA FEMORAL LIGAMENT RECONSTRUCTION Right 2025    Procedure: Knee arthroscopy, medial patellofemoral ligament reconstruction, possible lateral release, loose removal, meniscal and cartilage surgery as indicated;  Surgeon: Stevenson Jaimes MD;  Location: [facility] OR;  Service: Orthopedics;  Laterality: Right;       Visit Dx:     ICD-10-CM ICD-9-CM   1. Patellar instability of right knee  M25.361 718.86   2. Bodies, loose, joint, knee, right  M23.41 717.6              PT Ortho       Row Name 25 1100       Subjective    Subjective  Comments Patient reports that he followed up with Dr. Jaimes and he is pleased with his progress.  He is now able to be WBAT without assistive device with his knee locked in extension.  Patient reports that it feel good to weight bear but he does feel more soreness and is more swollen.  -SP       Precautions and Contraindications    Precautions per MD protocol  -SP       Posture/Observations    Observations Edema;Incision healing;Muscle atrophy  -SP    Posture/Observations Comments Patient ambulating with knee hinge brace locked in extension and is using walker NWB right LE;  incision well healed.  Moderate swelling present surrounding right knee.  -SP       Knee Palpation    Patella Right:;Tender;Swollen  -SP       Patellar Accessory Motions    Superior glide Right:;Hypomobile  mild limitation  -SP    Inferior glide Right:;Hypomobile  mild limitation  -SP    Medial glide Right:;WNL  -SP    Lateral glide --  NT per MD protocol  -SP       Knee Special Tests    Alina’s sign (DVT) Right:;Negative  -SP       Right Lower Ext    Rt Knee Extension/Flexion AROM 0 to 95 degrees AROM/  0 to 111 degrees AAROM after stretch  -SP       MMT Right Lower Ext    Rt Hip Flexion MMT, Gross Movement (4-/5) good minus  -SP    Rt Hip Extension MMT, Gross Movement (3/5) fair  -SP    Rt Hip ABduction MMT, Gross Movement (3/5) fair  -SP    Rt Hip ADduction MMT, Gross Movement (3+/5) fair plus  -SP    Rt Knee Extension MMT, Gross Movement (3/5) fair  -SP    Rt Knee Flexion MMT, Gross Movement (3/5) fair  -SP       Sensation    Sensation WNL? WNL  -SP       Lower Extremity Flexibility    Hamstrings Right:;Mildly limited;Moderately limited  -SP    Gastrocnemius Right:;Mildly limited  -SP    Soleus Right:;Mildly limited  -SP       Transfers    Sit-Stand [place] (Transfers) independent  -SP    Stand-Sit [place] (Transfers) independent  -SP    Transfers, Sit-Stand-Sit, Assist Device standard walker  NWB/TDWB R leg  -SP       Gait/Stairs  (Locomotion)    [place] Level (Gait) independent  -SP    Assistive Device (Gait) --  hinge brace locked in extension  -SP    Pattern (Gait) swing-through  -SP    Deviations/Abnormal Patterns (Gait) right sided deviations;antalgic;gait speed decreased  Pt is NWB/TDWB R leg in brace locked in full extension.  -SP    Right Sided Gait Deviations --  -SP    Comment, (Gait/Stairs) Patient is WBAT with hinge brace locked in extension, patient ambulates without assitive device  -SP              User Key  (r) = Recorded By, (t) = Taken By, (c) = Cosigned By      Initials Name Provider Type    Aicha Rai, PT Physical Therapist                                Therapy Education  Given: HEP  Program: Reinforced  How Provided: Verbal  Provided to: Patient  Level of Understanding: Verbalized, Demonstrated      PT OP Goals       Row Name 07/25/25 1200          PT Short Term Goals    STG Date to Achieve 08/09/25  -SP     STG 1 Pt to verbally report decreased R knee pain to <5/10 at worst with ADLs and everyday activities.  -SP     STG 1 Progress Met  -SP     STG 2 Pt independent with initial HEP issued by therapist (and per MPFL reconstruction protocol)..  -SP     STG 2 Progress Met  -SP     STG 3 R knee ROM improved to 0-90 degrees.  -SP     STG 3 Progress Met  -SP     STG 4 Good QS noted with exercise and pt able to do 10 reps independent SLR with brace locked in full extension.  -SP     STG 4 Progress Met  -SP     STG 5 Edema right knee decreased by 1-2 cm.  -SP     STG 5 Progress Progressing  -SP     STG 6 Pt able to transfer supine to sit to supine independently with no assistance needed with right leg.  Goal met  -SP        Long Term Goals    LTG Date to Achieve 08/24/25  -SP     LTG 1 Pt to verbally report decreased R knee pain to <3/10 at worst with ADLs and everyday activities.  -SP     LTG 1 Progress Progressing;Ongoing  -SP     LTG 2 Pt independent with more advanced HEP issued by therapist (and per  MPFL reconstruction protocol)..  -SP     LTG 2 Progress Progressing;Ongoing  -SP     LTG 3 R knee ROM improved to 0-120 degrees.  -SP     LTG 3 Progress Ongoing;Progressing  -SP     LTG 4 R knee and hip strength improved to 4-4+/5.  -SP     LTG 4 Progress Progressing;Ongoing  -SP     LTG 5 Edema R knee decreased to nominal.  -SP     LTG 5 Progress Ongoing  -SP     LTG 6 Pt able to ambulate without assistive device independently, WBAT R leg (as MD allows) with good gait speed and good balance noted.  -SP     LTG 6 Progress Goal Revised  -SP               User Key  (r) = Recorded By, (t) = Taken By, (c) = Cosigned By      Initials Name Provider Type    Aicha Rai, PT Physical Therapist                     PT Assessment/Plan       Row Name 07/25/25 1212          PT Assessment    Assessment Comments Patient demonstrates improving right knee ROM and strength.  He is able to independently SLR and is now ambulating without assisitve device with hinge brace locked in extension.  Patient has met most STGs and is progressing toward all goals.  -SP        PT Plan    PT Frequency 1x/week;2x/week  -SP     Predicted Duration of Therapy Intervention (PT) 4 weeks  -SP     PT Plan Comments Continue to progress right LE mobility, strength and gait.  -SP               User Key  (r) = Recorded By, (t) = Taken By, (c) = Cosigned By      Initials Name Provider Type    Aicha Rai, PT Physical Therapist                     Modalities       Row Name 07/25/25 1100             Ice    Ice Applied Yes  -SP      Location right knee with IFC  -SP                User Key  (r) = Recorded By, (t) = Taken By, (c) = Cosigned By      Initials Name Provider Type    Aicha Rai, PT Physical Therapist                   OP Exercises       Row Name 07/25/25 1222 07/25/25 1100 07/25/25 0900       Precautions    Existing Precautions/Restrictions -- -- other (see comments);brace on at all times;non-weight  bearing  Per MD GTZ protocol; brace locked in 0 degrees extension.  -SP       Subjective    Subjective Comments -- Patient reports that he followed up with Dr. Jaimes and he is pleased with his progress.  He is now able to be WBAT without assistive device with his knee locked in extension.  Patient reports that it feel good to weight bear but he does feel more soreness and is more swollen.  -SP --       Total Minutes    49795 - PT Therapeutic Exercise Minutes 15  -SP -- --    64669 - PT Manual Therapy Minutes 15  -SP -- --       Exercise 1    Exercise Name 1 -- -- Heel slides  -SP    Cueing 1 -- -- Verbal  -SP    Reps 1 -- -- 8 min  -SP    Time 1 -- -- 0 - 120 degrees per protocol  -SP       Exercise 2    Exercise Name 2 -- -- supine gastroc stretch with sheet  -SP    Cueing 2 -- -- Verbal  -SP    Reps 2 -- -- 10  -SP    Time 2 -- -- 10 sec  -SP       Exercise 3    Exercise Name 3 -- -- Russian stim with SAQ  -SP    Cueing 3 -- -- Verbal  -SP    Reps 3 -- -- 10/10  -SP    Time 3 -- -- 10 minutes  -SP       Exercise 4    Exercise Name 4 -- -- quad set  -SP    Cueing 4 -- -- Verbal  -SP    Reps 4 -- -- 40  -SP    Time 4 -- -- 5 sec  -SP       Exercise 5    Exercise Name 5 -- -- hip adduction/ ball squeeze  -SP    Cueing 5 -- -- Verbal  -SP    Reps 5 -- -- 40  -SP    Time 5 -- -- 5 sec  -SP       Exercise 6    Exercise Name 6 -- -- SLR in brace locked in full extension  -SP    Cueing 6 -- -- Verbal  -SP    Sets 6 -- -- 2  -SP    Reps 6 -- -- 20  -SP       Exercise 7    Exercise Name 7 -- -- sidelying hip adduction  -SP    Reps 7 -- -- 20  -SP              User Key  (r) = Recorded By, (t) = Taken By, (c) = Cosigned By      Initials Name Provider Type    Aicha Rai, PT Physical Therapist                  Manual Rx (Last 36 Hours)       Manual Treatments       Row Name 07/25/25 1222 07/25/25 1100          Total Minutes    72701 - PT Manual Therapy Minutes 15  -SP --        Manual Rx 1    Manual Rx 1  Location -- right knee  -SP     Manual Rx 1 Type -- patella mobilizations: medial glide, superior  and inferior glide  -SP     Manual Rx 1 Duration -- 20 each  -SP               User Key  (r) = Recorded By, (t) = Taken By, (c) = Cosigned By      Initials Name Provider Type    Aicha Rai, PT Physical Therapist                                          Time Calculation:     Start Time: 0938  Stop Time: 1035  Time Calculation (min): 57 min  Timed Charges  39622 - PT Therapeutic Exercise Minutes: 15  46632 - PT Manual Therapy Minutes: 15  Total Minutes  Timed Charges Total Minutes: 30   Total Minutes: 30     Therapy Charges for Today       Code Description Service Date Service Provider Modifiers Qty    17178319915 HC PT THER PROC EA 15 MIN 7/25/2025 Aicha Daniels, PT GP 1    82621273070 HC PT MANUAL THERAPY EA 15 MIN 7/25/2025 Aicha Daniels, PT GP 1                      Aicha Daniels, PT  7/25/2025

## 2025-07-28 ENCOUNTER — HOSPITAL ENCOUNTER (OUTPATIENT)
Dept: PHYSICAL THERAPY | Facility: HOSPITAL | Age: 30
Setting detail: THERAPIES SERIES
Discharge: HOME | End: 2025-07-28
Payer: COMMERCIAL

## 2025-07-28 DIAGNOSIS — M23.41 BODIES, LOOSE, JOINT, KNEE, RIGHT: ICD-10-CM

## 2025-07-28 DIAGNOSIS — M25.361 PATELLAR INSTABILITY OF RIGHT KNEE: Primary | ICD-10-CM

## 2025-07-28 PROCEDURE — 97110 THERAPEUTIC EXERCISES: CPT

## 2025-07-28 NOTE — THERAPY TREATMENT NOTE
Outpatient Physical Therapy Ortho Treatment Note   [facility]     Patient Name: Dagoberto Diana  : 1995  MRN: [id number]  Today's Date: 2025        Visit Date: 2025    Visit Dx:    ICD-10-CM ICD-9-CM   1. Patellar instability of right knee  M25.361 718.86   2. Bodies, loose, joint, knee, right  M23.41 717.6       Patient Active Problem List   Diagnosis    Heartburn    Patellar instability of right knee    Bodies, loose, joint, knee, right    Gastritis without bleeding    Chronic idiopathic constipation    status post right knee medial patellofemoral ligament reconstruction with allograft for patellar instability proximal realignment, arthroscopy with loose body removal, lateral lease, DOS 2025        Past Medical History:   Diagnosis Date    Ankle sprain     Handful of times over the years    Asthma     CHILDHOOD    CHI (closed head injury)     SEVERAL DURING CHILDHOOD   nO loc    Fracture of right foot     MILD HAIR LINE FX   WORE BOOT    Fracture, finger May 13th 2024    GERD (gastroesophageal reflux disease)     Heartburn     for gerd WORKUP    Knee swelling     First noticed the aching/stiff sensation in     Seasonal depression         Past Surgical History:   Procedure Laterality Date    APPENDECTOMY      DENTAL PROCEDURE      ENDOSCOPY N/A 2025    Procedure: ESOPHAGOGASTRODUODENOSCOPY WITH BIOPSY;  Surgeon: Santiago Breaux MD;  Location: [facility];  Service: Gastroenterology;  Laterality: N/A;  gastric bx to r/o h pylori    HERNIA REPAIR      infant    KNEE ARTHROSCOPY WITH PATELLA FEMORAL LIGAMENT RECONSTRUCTION Right 2025    Procedure: Knee arthroscopy, medial patellofemoral ligament reconstruction, possible lateral release, loose removal, meniscal and cartilage surgery as indicated;  Surgeon: Stevenson Jaimes MD;  Location: [facility];  Service: Orthopedics;  Laterality: Right;        PT Ortho       Row Name 25 1400       Subjective    Subjective Comments  "\"It has been feeling great.\" \"I am still taking it easy; no longer using the walker.\" \"I am still off work; I go back to work on 8/5 and I will be on light duty.\" \"I forgot to ask the doctor if I could unlock my brace for sleeping; I will message him and ask.\"  -LN       Precautions and Contraindications    Precautions per MD protocol  -LN       Subjective Pain    Able to rate subjective pain? yes  -LN    Pre-Treatment Pain Level 0  -LN    Subjective Pain Comment no pain reported  -LN       Right Lower Ext    Rt Knee Extension/Flexion AROM 0 to 95 degrees AROM pre-stretch/ 0 to 115 degrees AROM after stretch  -LN       MMT (Manual Muscle Testing)    General MMT Comments No Quad leg noted with SLR.  -LN              User Key  (r) = Recorded By, (t) = Taken By, (c) = Cosigned By      Initials Name Provider Type    Jennifer Knapp, PT Physical Therapist                                 PT Assessment/Plan       Row Name 07/28/25 1500          PT Assessment    Assessment Comments Pt tolerated treatment well with no c/o any knee pain. No Quad lag noted with SLR ,so he was able to do SLR without brace on today. He is ambulating well without any AD in brace locked in full knee extension. He is progressing well with exercises per MD protocol.  -LN        PT Plan    PT Frequency 1x/week;2x/week  -LN     PT Plan Comments Continue per POC and per MD protocol. Try SLS in brace as tolerated.  -LN               User Key  (r) = Recorded By, (t) = Taken By, (c) = Cosigned By      Initials Name Provider Type    Jennifer Knapp, PT Physical Therapist                     Modalities       Row Name 07/28/25 1400           Ice    Ice Applied --      Location --      PT Ice Rx Minutes --      Ice S/P Rx --      Patient denies application of Ice Yes  Pt declined post-Rx modalities  -LN      Patient reports will apply ice at home to involved area Yes  -LN         ELECTRICAL STIMULATION    Attended/Unattended --      " "Stimulation Type --      Location/Electrode Placement/Other --      PT E-Stim Unattended Minutes --                User Key  (r) = Recorded By, (t) = Taken By, (c) = Cosigned By      Initials Name Provider Type    Jennifer Knapp, PT Physical Therapist                   OP Exercises       Row Name 07/28/25 1400             Precautions    Existing Precautions/Restrictions other (see comments)  per MD protocol  -LN         Subjective    Subjective Comments \"It has been feeling great.\" \"I am still taking it easy; no longer using the walker.\" \"I am still off work; I go back to work on 8/5 and I will be on light duty.\" \"I forgot to ask the doctor if I could unlock my brace for sleeping; I will message him and ask.\"  -LN         Subjective Pain    Able to rate subjective pain? yes  -LN      Pre-Treatment Pain Level 0  -LN      Subjective Pain Comment no pain reported  -LN         Total Minutes    96034 - PT Therapeutic Exercise Minutes 45  -LN         Exercise 1    Exercise Name 1 Heel slides  -LN      Cueing 1 Verbal  -LN      Reps 1 8 min  -LN      Time 1 --  -LN         Exercise 2    Exercise Name 2 supine gastroc stretch with sheet  -LN      Cueing 2 Verbal  -LN      Reps 2 10  -LN      Time 2 10 sec  -LN         Exercise 3    Exercise Name 3 Russian stim with SAQ  -LN      Cueing 3 Verbal  -LN      Reps 3 10/10  -LN      Time 3 10 minutes  -LN         Exercise 4    Exercise Name 4 quad set  -LN      Cueing 4 Verbal  -LN      Reps 4 40  -LN      Time 4 5 sec  -LN         Exercise 5    Exercise Name 5 hip adduction/ ball squeeze  -LN      Cueing 5 Verbal  -LN      Reps 5 40  -LN      Time 5 5 sec  -LN         Exercise 6    Exercise Name 6 SLR- no brace  -LN      Cueing 6 Verbal  -LN      Sets 6 2  -LN      Reps 6 20  -LN         Exercise 7    Exercise Name 7 sidelying hip adduction  -LN      Reps 7 20  -LN         Exercise 8    Exercise Name 8 Airdyne bike  -LN      Cueing 8 Verbal  -LN      Time 8 6 minutes "  -LN      Additional Comments pt able to make full revolutions  -LN         Exercise 9    Exercise Name 9 Bridges with ball  -LN      Cueing 9 Verbal;Tactile;Demo  -LN      Reps 9 25  -LN      Time 9 5 sec  -LN         Exercise 10    Exercise Name 10 standing calf raises  -LN      Cueing 10 Verbal;Tactile;Demo  -LN      Reps 10 25  -LN      Additional Comments in brace locked in full extension; at elevated table  -LN                User Key  (r) = Recorded By, (t) = Taken By, (c) = Cosigned By      Initials Name Provider Type    Jennifer Knapp, PT Physical Therapist                                     Therapy Education  Education Details: Pt to continue with HEP as tolerated; added bridges with ball and standing calf raises (in brace locked in full extension).  Pt can now do SLR without brace on. Pt to use CP as needed. Pt can now ambulate without AD but still in brace locked in full extension.  Given: HEP, Symptoms/condition management  Program: New, Reinforced, Progressed (added bridges with ball and standing calf raises (in brace).)  How Provided: Verbal, Demonstration  Provided to: Patient  Level of Understanding: Teach back education performed, Verbalized, Demonstrated              Time Calculation:   Start Time: 1455  Stop Time: 1550  Time Calculation (min): 55 min  Timed Charges  65806 - PT Therapeutic Exercise Minutes: 45  Total Minutes  Timed Charges Total Minutes: 45   Total Minutes: 45  Therapy Charges for Today       Code Description Service Date Service Provider Modifiers Qty    [id number]  PT THER PROC EA 15 MIN 7/28/2025 Jennifer Sellers, PT GP 3                      Jennifer Sellers PT  7/28/2025

## 2025-07-30 ENCOUNTER — APPOINTMENT (OUTPATIENT)
Dept: PHYSICAL THERAPY | Facility: HOSPITAL | Age: 30
End: 2025-07-30
Payer: COMMERCIAL

## 2025-07-31 ENCOUNTER — HOSPITAL ENCOUNTER (OUTPATIENT)
Dept: PHYSICAL THERAPY | Facility: HOSPITAL | Age: 30
Setting detail: THERAPIES SERIES
Discharge: HOME | End: 2025-07-31
Payer: COMMERCIAL

## 2025-07-31 DIAGNOSIS — M23.41 BODIES, LOOSE, JOINT, KNEE, RIGHT: ICD-10-CM

## 2025-07-31 DIAGNOSIS — M25.361 PATELLAR INSTABILITY OF RIGHT KNEE: Primary | ICD-10-CM

## 2025-07-31 PROCEDURE — 97110 THERAPEUTIC EXERCISES: CPT

## 2025-07-31 NOTE — THERAPY TREATMENT NOTE
Outpatient Physical Therapy Ortho Treatment Note   Nikita     Patient Name: Dagoberto Diana  : 1995  MRN: [id number]  Today's Date: 2025      Visit Date: 2025    Visit Dx:    ICD-10-CM ICD-9-CM   1. Patellar instability of right knee  M25.361 718.86   2. Bodies, loose, joint, knee, right  M23.41 717.6       Patient Active Problem List   Diagnosis    Heartburn    Patellar instability of right knee    Bodies, loose, joint, knee, right    Gastritis without bleeding    Chronic idiopathic constipation    status post right knee medial patellofemoral ligament reconstruction with allograft for patellar instability proximal realignment, arthroscopy with loose body removal, lateral lease, DOS 2025        Past Medical History:   Diagnosis Date    Ankle sprain     Handful of times over the years    Asthma     CHILDHOOD    CHI (closed head injury)     SEVERAL DURING CHILDHOOD   nO loc    Fracture of right foot     MILD HAIR LINE FX   WORE BOOT    Fracture, finger May 13th 2024    GERD (gastroesophageal reflux disease)     Heartburn     for gerd WORKUP    Knee swelling     First noticed the aching/stiff sensation in     Seasonal depression         Past Surgical History:   Procedure Laterality Date    APPENDECTOMY      DENTAL PROCEDURE      ENDOSCOPY N/A 2025    Procedure: ESOPHAGOGASTRODUODENOSCOPY WITH BIOPSY;  Surgeon: Santiago Breaux MD;  Location: [facility];  Service: Gastroenterology;  Laterality: N/A;  gastric bx to r/o h pylori    HERNIA REPAIR      infant    KNEE ARTHROSCOPY WITH PATELLA FEMORAL LIGAMENT RECONSTRUCTION Right 2025    Procedure: Knee arthroscopy, medial patellofemoral ligament reconstruction, possible lateral release, loose removal, meniscal and cartilage surgery as indicated;  Surgeon: Stevenson Jaimes MD;  Location: [facility] OR;  Service: Orthopedics;  Laterality: Right;        PT Ortho       Row Name 25 0900       Subjective    Subjective Comments  pt states he is doing well; forgot to message MD to see if he can unlock brace to sleep  -       Precautions and Contraindications    Precautions per MD protocol  -MH       Right Lower Ext    Rt Knee Extension/Flexion AROM 0 - 114 degrees AAROM flexion, supine post stretches  -              User Key  (r) = Recorded By, (t) = Taken By, (c) = Cosigned By      Initials Name Provider Type    AI Thor Hernandez PTA Physical Therapist Assistant                                 PT Assessment/Plan       Row Name 07/31/25 1538          PT Assessment    Assessment Comments pt tolerating gait with brace without AD well; strength continues to improve - good tolerance to addition of SLS ex  -MH        PT Plan    PT Plan Comments Cont per POC, progressing per protocol  -               User Key  (r) = Recorded By, (t) = Taken By, (c) = Cosigned By      Initials Name Provider Type     Thor Hernandez PTA Physical Therapist Assistant                       OP Exercises       Row Name 07/31/25 1543 07/31/25 0900          Precautions    Existing Precautions/Restrictions -- other (see comments)  per MD protocol  -        Subjective    Subjective Comments -- pt states he is doing well; forgot to message MD to see if he can unlock brace to sleep  -        Total Minutes    26415 - PT Therapeutic Exercise Minutes 45  -MH --        Exercise 1    Exercise Name 1 -- Heel slides  -MH     Cueing 1 -- Verbal  -MH     Reps 1 -- 8 min  -MH        Exercise 2    Exercise Name 2 -- supine gastroc stretch with sheet  -MH     Cueing 2 -- Verbal  -MH     Reps 2 -- 10  -MH     Time 2 -- 10 sec  -MH        Exercise 3    Exercise Name 3 -- Russian stim with SAQ  -MH     Cueing 3 -- Verbal  -MH     Reps 3 -- 10/10  -MH     Time 3 -- 10 minutes  -MH        Exercise 4    Exercise Name 4 -- quad set  -MH     Cueing 4 -- Verbal  -MH     Reps 4 -- 40  -MH     Time 4 -- 5 sec  -MH        Exercise 5    Exercise Name 5 -- hip adduction/ ball squeeze   -MH     Cueing 5 -- --  -MH     Reps 5 -- --  -MH     Time 5 -- --  -MH        Exercise 6    Exercise Name 6 -- SLR- no brace  -     Cueing 6 -- Verbal  -MH     Sets 6 -- 2  -MH     Reps 6 -- 20  -MH        Exercise 7    Exercise Name 7 -- sidelying hip adduction  -MH     Reps 7 -- 20  -MH        Exercise 8    Exercise Name 8 -- Airdyne bike -s eat 1  -     Cueing 8 -- Verbal  -     Time 8 -- 6 minutes  -        Exercise 9    Exercise Name 9 -- Bridges with ball  -     Cueing 9 -- Verbal;Tactile;Demo  -     Reps 9 -- 25  -MH     Time 9 -- 5 sec  -        Exercise 10    Exercise Name 10 -- standing calf raises  -     Cueing 10 -- Verbal;Tactile;Demo  -     Sets 10 -- brace locked 0 degrees extension  -     Reps 10 -- 25  -MH     Additional Comments -- support at table  -        Exercise 11    Exercise Name 11 -- SLS  -MH     Cueing 11 -- Verbal;Tactile;Demo  -     Sets 11 -- brace locked 0 degrees extension  -MH     Reps 11 -- 2  -MH     Time 11 -- 15 sec  -     Additional Comments -- support at table  -               User Key  (r) = Recorded By, (t) = Taken By, (c) = Cosigned By      Initials Name Provider Type     Thor Hernandez PTA Physical Therapist Assistant                                     Therapy Education  Given: HEP, Symptoms/condition management  Program: Reinforced  How Provided: Verbal, Demonstration  Provided to: Patient  Level of Understanding: Teach back education performed, Verbalized, Demonstrated              Time Calculation:   Start Time: 0902  Stop Time: 1005  Time Calculation (min): 63 min  Timed Charges  84247 - PT Therapeutic Exercise Minutes: 45  Total Minutes  Timed Charges Total Minutes: 45   Total Minutes: 45  Therapy Charges for Today       Code Description Service Date Service Provider Modifiers Qty    [id number] HC PT THER PROC EA 15 MIN 7/31/2025 Thor Hernandez PTA GP 3                      Thor Hernandez PTA  7/31/2025

## 2025-08-05 ENCOUNTER — HOSPITAL ENCOUNTER (OUTPATIENT)
Dept: PHYSICAL THERAPY | Facility: HOSPITAL | Age: 30
Setting detail: THERAPIES SERIES
Discharge: HOME | End: 2025-08-05
Payer: COMMERCIAL

## 2025-08-05 DIAGNOSIS — M23.41 BODIES, LOOSE, JOINT, KNEE, RIGHT: ICD-10-CM

## 2025-08-05 DIAGNOSIS — M25.361 PATELLAR INSTABILITY OF RIGHT KNEE: Primary | ICD-10-CM

## 2025-08-05 PROCEDURE — 97110 THERAPEUTIC EXERCISES: CPT

## 2025-08-08 ENCOUNTER — HOSPITAL ENCOUNTER (OUTPATIENT)
Dept: PHYSICAL THERAPY | Facility: HOSPITAL | Age: 30
Setting detail: THERAPIES SERIES
Discharge: HOME | End: 2025-08-08
Payer: COMMERCIAL

## 2025-08-08 DIAGNOSIS — M25.361 PATELLAR INSTABILITY OF RIGHT KNEE: Primary | ICD-10-CM

## 2025-08-08 DIAGNOSIS — M23.41 BODIES, LOOSE, JOINT, KNEE, RIGHT: ICD-10-CM

## 2025-08-08 PROCEDURE — 97110 THERAPEUTIC EXERCISES: CPT

## 2025-08-12 ENCOUNTER — HOSPITAL ENCOUNTER (OUTPATIENT)
Dept: PHYSICAL THERAPY | Facility: HOSPITAL | Age: 30
Setting detail: THERAPIES SERIES
Discharge: HOME | End: 2025-08-12
Payer: COMMERCIAL

## 2025-08-12 DIAGNOSIS — M25.361 PATELLAR INSTABILITY OF RIGHT KNEE: Primary | ICD-10-CM

## 2025-08-12 DIAGNOSIS — M23.41 BODIES, LOOSE, JOINT, KNEE, RIGHT: ICD-10-CM

## 2025-08-12 PROCEDURE — 97110 THERAPEUTIC EXERCISES: CPT

## 2025-08-14 ENCOUNTER — HOSPITAL ENCOUNTER (OUTPATIENT)
Dept: PHYSICAL THERAPY | Facility: HOSPITAL | Age: 30
Setting detail: THERAPIES SERIES
Discharge: HOME | End: 2025-08-14
Payer: COMMERCIAL

## 2025-08-14 DIAGNOSIS — M25.361 PATELLAR INSTABILITY OF RIGHT KNEE: Primary | ICD-10-CM

## 2025-08-14 DIAGNOSIS — M23.41 BODIES, LOOSE, JOINT, KNEE, RIGHT: ICD-10-CM

## 2025-08-14 PROCEDURE — 97110 THERAPEUTIC EXERCISES: CPT

## 2025-08-18 ENCOUNTER — HOSPITAL ENCOUNTER (OUTPATIENT)
Dept: PHYSICAL THERAPY | Facility: HOSPITAL | Age: 30
Setting detail: THERAPIES SERIES
Discharge: HOME | End: 2025-08-18
Payer: COMMERCIAL

## 2025-08-18 DIAGNOSIS — M23.41 BODIES, LOOSE, JOINT, KNEE, RIGHT: ICD-10-CM

## 2025-08-18 DIAGNOSIS — M25.361 PATELLAR INSTABILITY OF RIGHT KNEE: Primary | ICD-10-CM

## 2025-08-18 PROCEDURE — 97110 THERAPEUTIC EXERCISES: CPT

## 2025-08-20 ENCOUNTER — OFFICE VISIT (OUTPATIENT)
Dept: ORTHOPEDIC SURGERY | Facility: CLINIC | Age: 30
End: 2025-08-20
Payer: COMMERCIAL

## 2025-08-20 VITALS — WEIGHT: 210 LBS | BODY MASS INDEX: 35.85 KG/M2 | HEIGHT: 64 IN

## 2025-08-20 DIAGNOSIS — Z98.890 STATUS POST ARTHROSCOPIC KNEE SURGERY: Primary | ICD-10-CM

## 2025-08-20 RX ORDER — MELOXICAM 15 MG/1
15 TABLET ORAL DAILY
COMMUNITY

## 2025-08-22 ENCOUNTER — HOSPITAL ENCOUNTER (OUTPATIENT)
Dept: PHYSICAL THERAPY | Facility: HOSPITAL | Age: 30
Setting detail: THERAPIES SERIES
Discharge: HOME | End: 2025-08-22
Payer: COMMERCIAL

## 2025-08-22 DIAGNOSIS — M25.361 PATELLAR INSTABILITY OF RIGHT KNEE: Primary | ICD-10-CM

## 2025-08-22 DIAGNOSIS — M23.41 BODIES, LOOSE, JOINT, KNEE, RIGHT: ICD-10-CM

## 2025-08-22 PROCEDURE — 97110 THERAPEUTIC EXERCISES: CPT

## 2025-08-26 ENCOUNTER — HOSPITAL ENCOUNTER (OUTPATIENT)
Dept: PHYSICAL THERAPY | Facility: HOSPITAL | Age: 30
Setting detail: THERAPIES SERIES
Discharge: HOME | End: 2025-08-26
Payer: COMMERCIAL

## 2025-08-26 DIAGNOSIS — M23.41 BODIES, LOOSE, JOINT, KNEE, RIGHT: ICD-10-CM

## 2025-08-26 DIAGNOSIS — M25.361 PATELLAR INSTABILITY OF RIGHT KNEE: Primary | ICD-10-CM

## 2025-08-26 PROCEDURE — 97110 THERAPEUTIC EXERCISES: CPT

## (undated) DEVICE — SOL IRR H2O BO 1000ML STRL

## (undated) DEVICE — 1.2 RAP-PAC B LATEX FREE: Brand: RAP-PAC

## (undated) DEVICE — TUBING, SUCTION, 1/4" X 20', STRAIGHT: Brand: MEDLINE INDUSTRIES, INC.

## (undated) DEVICE — SUT MNCRYL 3/0 PS2 18IN Y497G

## (undated) DEVICE — PADDING,UNDERCAST,COTTON, 4"X4YD STERILE: Brand: MEDLINE

## (undated) DEVICE — LINER SURG CANSTR SXN S/RIGD 1500CC

## (undated) DEVICE — WRAP KN COMPR COLD/THERP

## (undated) DEVICE — SOL ISO/ALC RUB 70PCT 4OZ

## (undated) DEVICE — 4-PORT MANIFOLD: Brand: NEPTUNE 2

## (undated) DEVICE — QFIX 1.8 MINI SUTURE ANCHOR DISP KIT: Brand: QFIX

## (undated) DEVICE — LAG ARTHROSCOPY: Brand: MEDLINE INDUSTRIES, INC.

## (undated) DEVICE — ADAPT CLN BIOGUARD AIR/H2O DISP

## (undated) DEVICE — GLV SURG SENSICARE PI PF LF 7 GRN STRL

## (undated) DEVICE — SYR LL W/SCALE/MARK 3ML STRL

## (undated) DEVICE — ST TB GOFLO STRL

## (undated) DEVICE — VIAL FORMALIN CAP 10P 40ML

## (undated) DEVICE — GLV SURG SENSICARE PF POLYISPRN SZ8 LF

## (undated) DEVICE — BNDG,ELSTC,MATRIX,STRL,6"X5YD,LF,HOOK&LP: Brand: MEDLINE

## (undated) DEVICE — SKIN PREP TRAY W/CHG: Brand: MEDLINE INDUSTRIES, INC.

## (undated) DEVICE — 3M™ IOBAN™ 2 ANTIMICROBIAL INCISE DRAPE 6650EZ: Brand: IOBAN™ 2

## (undated) DEVICE — GLV SURG SENSICARE PI LF PF 7.0

## (undated) DEVICE — Device

## (undated) DEVICE — COVER,MAYO STAND,STERILE: Brand: MEDLINE

## (undated) DEVICE — SUT ABS VICRLY/PLS COAT CR 2/0 CP/2/REV/CUT/NDL 8X18IN UD

## (undated) DEVICE — FRCP BX RADJAW4 NDL 2.8 240CM LG OG BX40

## (undated) DEVICE — DRP C/ARM MINI

## (undated) DEVICE — DRSNG SURESITE WNDW 4X4.5

## (undated) DEVICE — PK BASIC ORTHO 90

## (undated) DEVICE — QFIX 1.8 MINI SUTURE ANCHOR
Type: IMPLANTABLE DEVICE | Site: KNEE | Status: NON-FUNCTIONAL
Brand: Q-FIX
Removed: 2025-06-27

## (undated) DEVICE — PCH INST SURG INVISISHIELD 2PCKT

## (undated) DEVICE — BNDG,ELSTC,MATRIX,STRL,4"X5YD,LF,HOOK&LP: Brand: MEDLINE

## (undated) DEVICE — TOWEL,OR,DSP,ST,BLUE,STD,4/PK,20PK/CS: Brand: MEDLINE

## (undated) DEVICE — SPNG GZ WOVN 4X4IN 12PLY 10/BX STRL

## (undated) DEVICE — THE BITE BLOCK MAXI, LATEX FREE STRAP IS USED TO PROTECT THE ENDOSCOPE INSERTION TUBE FROM BEING BITTEN BY THE PATIENT.

## (undated) DEVICE — KT ORCA ORCAPOD DISP STRL

## (undated) DEVICE — SUT ETHLN 3/0 PS2 18 IN 1669H

## (undated) DEVICE — WEREWOLF FLOW 50 COBLATION WAND: Brand: COBLATION

## (undated) DEVICE — SYS IRR SURGIPHOR A/MIC RTU BO PVPI 450ML STRL

## (undated) DEVICE — BRACE P/OP KN XROM TELESCP ADJ UNIV

## (undated) DEVICE — MAT FLR ABSORBENT LG 4FT 10 2.5FT

## (undated) DEVICE — INTENT TO BE USED WITH SUTURE MATERIAL FOR TISSUE CLOSURE: Brand: RICHARD-ALLAN® NEEDLE 1/2 CIRCLE TAPER

## (undated) DEVICE — BW-412T DISP COMBO CLEANING BRUSH: Brand: SINGLE USE COMBINATION CLEANING BRUSH